# Patient Record
Sex: FEMALE | Race: WHITE | URBAN - METROPOLITAN AREA
[De-identification: names, ages, dates, MRNs, and addresses within clinical notes are randomized per-mention and may not be internally consistent; named-entity substitution may affect disease eponyms.]

---

## 2017-10-23 ENCOUNTER — HISTORICAL (OUTPATIENT)
Dept: ADMINISTRATIVE | Facility: HOSPITAL | Age: 67
End: 2017-10-23

## 2017-10-27 ENCOUNTER — HISTORICAL (OUTPATIENT)
Dept: ADMINISTRATIVE | Facility: HOSPITAL | Age: 67
End: 2017-10-27

## 2017-12-25 ENCOUNTER — HISTORICAL (OUTPATIENT)
Dept: ADMINISTRATIVE | Facility: HOSPITAL | Age: 67
End: 2017-12-25

## 2018-02-22 ENCOUNTER — HISTORICAL (OUTPATIENT)
Dept: ADMINISTRATIVE | Facility: HOSPITAL | Age: 68
End: 2018-02-22

## 2018-04-04 ENCOUNTER — HISTORICAL (OUTPATIENT)
Dept: ADMINISTRATIVE | Facility: HOSPITAL | Age: 68
End: 2018-04-04

## 2018-04-05 LAB — GRAM STN SPEC: NORMAL

## 2018-04-07 LAB — FINAL CULTURE: NORMAL

## 2018-04-24 ENCOUNTER — HISTORICAL (OUTPATIENT)
Dept: ADMINISTRATIVE | Facility: HOSPITAL | Age: 68
End: 2018-04-24

## 2018-04-24 LAB
ABS NEUT (OLG): 4.76 X10(3)/MCL (ref 2.1–9.2)
ALBUMIN SERPL-MCNC: 3.7 GM/DL (ref 3.4–5)
ALBUMIN/GLOB SERPL: 0.9 RATIO (ref 1.1–2)
ALP SERPL-CCNC: 77 UNIT/L (ref 38–126)
ALT SERPL-CCNC: 32 UNIT/L (ref 12–78)
APPEARANCE, UA: ABNORMAL
AST SERPL-CCNC: 21 UNIT/L (ref 15–37)
BACTERIA SPEC CULT: ABNORMAL /HPF
BASOPHILS # BLD AUTO: 0 X10(3)/MCL (ref 0–0.2)
BASOPHILS NFR BLD AUTO: 0 %
BILIRUB SERPL-MCNC: 0.6 MG/DL (ref 0.2–1)
BILIRUB UR QL STRIP: NEGATIVE
BILIRUBIN DIRECT+TOT PNL SERPL-MCNC: 0 MG/DL (ref 0–0.5)
BILIRUBIN DIRECT+TOT PNL SERPL-MCNC: 0.6 MG/DL (ref 0–0.8)
BUN SERPL-MCNC: 26 MG/DL (ref 7–18)
CALCIUM SERPL-MCNC: 9.6 MG/DL (ref 8.5–10.1)
CHLORIDE SERPL-SCNC: 103 MMOL/L (ref 98–107)
CHOLEST SERPL-MCNC: 251 MG/DL (ref 0–200)
CHOLEST/HDLC SERPL: 6.3 {RATIO} (ref 0–4)
CO2 SERPL-SCNC: 31 MMOL/L (ref 21–32)
COLOR UR: YELLOW
CREAT SERPL-MCNC: 0.98 MG/DL (ref 0.55–1.02)
CREAT UR-MCNC: 83 MG/DL
EOSINOPHIL # BLD AUTO: 0.2 X10(3)/MCL (ref 0–0.9)
EOSINOPHIL NFR BLD AUTO: 3 %
ERYTHROCYTE [DISTWIDTH] IN BLOOD BY AUTOMATED COUNT: 13.5 % (ref 11.5–17)
EST. AVERAGE GLUCOSE BLD GHB EST-MCNC: 183 MG/DL
GLOBULIN SER-MCNC: 4 GM/DL (ref 2.4–3.5)
GLUCOSE (UA): NEGATIVE
GLUCOSE SERPL-MCNC: 216 MG/DL (ref 74–106)
HBA1C MFR BLD: 8 % (ref 4.2–6.3)
HCT VFR BLD AUTO: 36.1 % (ref 37–47)
HDLC SERPL-MCNC: 40 MG/DL (ref 35–60)
HGB BLD-MCNC: 11.7 GM/DL (ref 12–16)
HGB UR QL STRIP: NEGATIVE
KETONES UR QL STRIP: NEGATIVE
LDLC SERPL CALC-MCNC: 121 MG/DL (ref 0–129)
LEUKOCYTE ESTERASE UR QL STRIP: NEGATIVE
LYMPHOCYTES # BLD AUTO: 2.2 X10(3)/MCL (ref 0.6–4.6)
LYMPHOCYTES NFR BLD AUTO: 28 %
MCH RBC QN AUTO: 30.2 PG (ref 27–31)
MCHC RBC AUTO-ENTMCNC: 32.4 GM/DL (ref 33–36)
MCV RBC AUTO: 93.3 FL (ref 80–94)
MICROALBUMIN UR-MCNC: 1.1 MG/DL
MICROALBUMIN/CREAT RATIO PNL UR: 13.3 MG/GM CR (ref 0–30)
MONOCYTES # BLD AUTO: 0.6 X10(3)/MCL (ref 0.1–1.3)
MONOCYTES NFR BLD AUTO: 8 %
NEUTROPHILS # BLD AUTO: 4.76 X10(3)/MCL (ref 2.1–9.2)
NEUTROPHILS NFR BLD AUTO: 61 %
NITRITE UR QL STRIP: NEGATIVE
PH UR STRIP: 5 [PH] (ref 5–9)
PLATELET # BLD AUTO: 262 X10(3)/MCL (ref 130–400)
PMV BLD AUTO: 11.3 FL (ref 9.4–12.4)
POTASSIUM SERPL-SCNC: 4 MMOL/L (ref 3.5–5.1)
PROT SERPL-MCNC: 7.7 GM/DL (ref 6.4–8.2)
PROT UR QL STRIP: NEGATIVE
RBC # BLD AUTO: 3.87 X10(6)/MCL (ref 4.2–5.4)
RBC #/AREA URNS HPF: ABNORMAL /[HPF]
SODIUM SERPL-SCNC: 141 MMOL/L (ref 136–145)
SP GR UR STRIP: 1.02 (ref 1–1.03)
SQUAMOUS EPITHELIAL, UA: ABNORMAL
TRIGL SERPL-MCNC: 452 MG/DL (ref 30–150)
TSH SERPL-ACNC: 1.05 MIU/L (ref 0.36–3.74)
UA WBC MAN: ABNORMAL
UROBILINOGEN UR STRIP-ACNC: 0.2
VLDLC SERPL CALC-MCNC: 90 MG/DL
WBC # SPEC AUTO: 7.8 X10(3)/MCL (ref 4.5–11.5)

## 2018-04-26 LAB — FINAL CULTURE: NORMAL

## 2018-09-04 ENCOUNTER — HISTORICAL (OUTPATIENT)
Dept: ADMINISTRATIVE | Facility: HOSPITAL | Age: 68
End: 2018-09-04

## 2018-09-19 ENCOUNTER — HISTORICAL (OUTPATIENT)
Dept: ADMINISTRATIVE | Facility: HOSPITAL | Age: 68
End: 2018-09-19

## 2018-09-19 LAB — POC CREATININE: 0.9 MG/DL (ref 0.6–1.3)

## 2019-04-05 ENCOUNTER — HISTORICAL (OUTPATIENT)
Dept: ADMINISTRATIVE | Facility: HOSPITAL | Age: 69
End: 2019-04-05

## 2019-04-05 LAB
ABS NEUT (OLG): 13.25 X10(3)/MCL (ref 2.1–9.2)
ALBUMIN SERPL-MCNC: 3.2 GM/DL (ref 3.4–5)
ALBUMIN/GLOB SERPL: 0.9 {RATIO}
ALP SERPL-CCNC: 85 UNIT/L (ref 38–126)
ALT SERPL-CCNC: 44 UNIT/L (ref 12–78)
APPEARANCE, UA: CLEAR
AST SERPL-CCNC: 13 UNIT/L (ref 15–37)
BACTERIA SPEC CULT: ABNORMAL /HPF
BASOPHILS # BLD AUTO: 0 X10(3)/MCL (ref 0–0.2)
BASOPHILS NFR BLD AUTO: 0 %
BILIRUB SERPL-MCNC: 0.3 MG/DL (ref 0.2–1)
BILIRUB UR QL STRIP: NEGATIVE
BILIRUBIN DIRECT+TOT PNL SERPL-MCNC: 0.1 MG/DL (ref 0–0.2)
BILIRUBIN DIRECT+TOT PNL SERPL-MCNC: 0.2 MG/DL (ref 0–0.8)
BUN SERPL-MCNC: 48 MG/DL (ref 7–18)
CALCIUM SERPL-MCNC: 9.4 MG/DL (ref 8.5–10.1)
CHLORIDE SERPL-SCNC: 98 MMOL/L (ref 98–107)
CHOLEST SERPL-MCNC: 274 MG/DL (ref 0–200)
CHOLEST/HDLC SERPL: 2.5 {RATIO} (ref 0–4)
CO2 SERPL-SCNC: 30 MMOL/L (ref 21–32)
COLOR UR: YELLOW
CREAT SERPL-MCNC: 1 MG/DL (ref 0.55–1.02)
CREAT UR-MCNC: 60 MG/DL
EOSINOPHIL # BLD AUTO: 0.1 X10(3)/MCL (ref 0–0.9)
EOSINOPHIL NFR BLD AUTO: 0 %
ERYTHROCYTE [DISTWIDTH] IN BLOOD BY AUTOMATED COUNT: 15.9 % (ref 11.5–17)
EST. AVERAGE GLUCOSE BLD GHB EST-MCNC: 237 MG/DL
GLOBULIN SER-MCNC: 3.4 GM/DL (ref 2.4–3.5)
GLUCOSE (UA): ABNORMAL
GLUCOSE SERPL-MCNC: 333 MG/DL (ref 74–106)
HBA1C MFR BLD: 9.9 % (ref 4.2–6.3)
HCT VFR BLD AUTO: 42.7 % (ref 37–47)
HDLC SERPL-MCNC: 111 MG/DL (ref 35–60)
HGB BLD-MCNC: 13 GM/DL (ref 12–16)
HGB UR QL STRIP: NEGATIVE
KETONES UR QL STRIP: NEGATIVE
LDLC SERPL CALC-MCNC: 134 MG/DL (ref 0–129)
LEUKOCYTE ESTERASE UR QL STRIP: NEGATIVE
LYMPHOCYTES # BLD AUTO: 0.8 X10(3)/MCL (ref 0.6–4.6)
LYMPHOCYTES NFR BLD AUTO: 5 %
MCH RBC QN AUTO: 30.5 PG (ref 27–31)
MCHC RBC AUTO-ENTMCNC: 30.4 GM/DL (ref 33–36)
MCV RBC AUTO: 100.2 FL (ref 80–94)
MICROALBUMIN UR-MCNC: 2.7 MG/DL
MICROALBUMIN/CREAT RATIO PNL UR: 45 MG/GM CR (ref 0–30)
MONOCYTES # BLD AUTO: 1 X10(3)/MCL (ref 0.1–1.3)
MONOCYTES NFR BLD AUTO: 7 %
NEUTROPHILS # BLD AUTO: 13.25 X10(3)/MCL (ref 2.1–9.2)
NEUTROPHILS NFR BLD AUTO: 87 %
NITRITE UR QL STRIP: NEGATIVE
PH UR STRIP: 5 [PH] (ref 5–9)
PLATELET # BLD AUTO: 180 X10(3)/MCL (ref 130–400)
PMV BLD AUTO: 11.2 FL (ref 9.4–12.4)
POTASSIUM SERPL-SCNC: 4.1 MMOL/L (ref 3.5–5.1)
PROT SERPL-MCNC: 6.6 GM/DL (ref 6.4–8.2)
PROT UR QL STRIP: NEGATIVE
RBC # BLD AUTO: 4.26 X10(6)/MCL (ref 4.2–5.4)
RBC #/AREA URNS HPF: 5 /HPF (ref 0–2)
SODIUM SERPL-SCNC: 139 MMOL/L (ref 136–145)
SP GR UR STRIP: 1.03 (ref 1–1.03)
SQUAMOUS EPITHELIAL, UA: ABNORMAL
TRIGL SERPL-MCNC: 143 MG/DL (ref 30–150)
TSH SERPL-ACNC: 0.13 MIU/L (ref 0.36–3.74)
UROBILINOGEN UR STRIP-ACNC: 0.2
VLDLC SERPL CALC-MCNC: 29 MG/DL
WBC # SPEC AUTO: 15.3 X10(3)/MCL (ref 4.5–11.5)
WBC #/AREA URNS HPF: ABNORMAL /[HPF]

## 2019-05-01 ENCOUNTER — HISTORICAL (OUTPATIENT)
Dept: ADMINISTRATIVE | Facility: HOSPITAL | Age: 69
End: 2019-05-01

## 2019-05-07 ENCOUNTER — HISTORICAL (OUTPATIENT)
Dept: ADMINISTRATIVE | Facility: HOSPITAL | Age: 69
End: 2019-05-07

## 2019-05-07 LAB
ABS NEUT (OLG): 7.44 X10(3)/MCL (ref 2.1–9.2)
ALBUMIN SERPL-MCNC: 3 GM/DL (ref 3.4–5)
ALBUMIN/GLOB SERPL: 0.9 {RATIO}
ALP SERPL-CCNC: 68 UNIT/L (ref 38–126)
ALT SERPL-CCNC: 62 UNIT/L (ref 12–78)
AST SERPL-CCNC: 15 UNIT/L (ref 15–37)
BASOPHILS # BLD AUTO: 0 X10(3)/MCL (ref 0–0.2)
BASOPHILS NFR BLD AUTO: 0 %
BILIRUB SERPL-MCNC: 0.3 MG/DL (ref 0.2–1)
BILIRUBIN DIRECT+TOT PNL SERPL-MCNC: 0.1 MG/DL (ref 0–0.2)
BILIRUBIN DIRECT+TOT PNL SERPL-MCNC: 0.2 MG/DL (ref 0–0.8)
BUN SERPL-MCNC: 40 MG/DL (ref 7–18)
CALCIUM SERPL-MCNC: 8.4 MG/DL (ref 8.5–10.1)
CHLORIDE SERPL-SCNC: 101 MMOL/L (ref 98–107)
CO2 SERPL-SCNC: 25 MMOL/L (ref 21–32)
CREAT SERPL-MCNC: 1.12 MG/DL (ref 0.55–1.02)
EOSINOPHIL # BLD AUTO: 0.1 X10(3)/MCL (ref 0–0.9)
EOSINOPHIL NFR BLD AUTO: 1 %
ERYTHROCYTE [DISTWIDTH] IN BLOOD BY AUTOMATED COUNT: 15.9 % (ref 11.5–17)
GLOBULIN SER-MCNC: 3.4 GM/DL (ref 2.4–3.5)
GLUCOSE SERPL-MCNC: 352 MG/DL (ref 74–106)
HCT VFR BLD AUTO: 40.5 % (ref 37–47)
HGB BLD-MCNC: 12.7 GM/DL (ref 12–16)
LYMPHOCYTES # BLD AUTO: 2.2 X10(3)/MCL (ref 0.6–4.6)
LYMPHOCYTES NFR BLD AUTO: 20 %
MCH RBC QN AUTO: 30.8 PG (ref 27–31)
MCHC RBC AUTO-ENTMCNC: 31.4 GM/DL (ref 33–36)
MCV RBC AUTO: 98.1 FL (ref 80–94)
MONOCYTES # BLD AUTO: 0.8 X10(3)/MCL (ref 0.1–1.3)
MONOCYTES NFR BLD AUTO: 7 %
NEUTROPHILS # BLD AUTO: 7.44 X10(3)/MCL (ref 2.1–9.2)
NEUTROPHILS NFR BLD AUTO: 69 %
PLATELET # BLD AUTO: 181 X10(3)/MCL (ref 130–400)
PMV BLD AUTO: 11.3 FL (ref 9.4–12.4)
POTASSIUM SERPL-SCNC: 3.9 MMOL/L (ref 3.5–5.1)
PROT SERPL-MCNC: 6.4 GM/DL (ref 6.4–8.2)
RBC # BLD AUTO: 4.13 X10(6)/MCL (ref 4.2–5.4)
SODIUM SERPL-SCNC: 134 MMOL/L (ref 136–145)
T3FREE SERPL-MCNC: 1.8 PG/ML (ref 2.18–3.98)
T4 FREE SERPL-MCNC: 0.73 NG/DL (ref 0.76–1.46)
TSH SERPL-ACNC: 0.47 MIU/L (ref 0.36–3.74)
WBC # SPEC AUTO: 10.8 X10(3)/MCL (ref 4.5–11.5)

## 2019-07-05 ENCOUNTER — HISTORICAL (OUTPATIENT)
Dept: ADMINISTRATIVE | Facility: HOSPITAL | Age: 69
End: 2019-07-05

## 2019-07-05 LAB
EST. AVERAGE GLUCOSE BLD GHB EST-MCNC: 203 MG/DL
HBA1C MFR BLD: 8.7 % (ref 4.2–6.3)

## 2019-08-01 ENCOUNTER — HISTORICAL (OUTPATIENT)
Dept: ADMINISTRATIVE | Facility: HOSPITAL | Age: 69
End: 2019-08-01

## 2019-08-01 LAB
ABS NEUT (OLG): 13.23 X10(3)/MCL (ref 2.1–9.2)
ALBUMIN SERPL-MCNC: 3.5 GM/DL (ref 3.4–5)
ALBUMIN/GLOB SERPL: 1 RATIO (ref 1.1–2)
ALP SERPL-CCNC: 128 UNIT/L (ref 38–126)
ALT SERPL-CCNC: 32 UNIT/L (ref 12–78)
APTT PPP: 24 SECOND(S) (ref 24.2–33.9)
AST SERPL-CCNC: 11 UNIT/L (ref 15–37)
BASOPHILS # BLD AUTO: 0 X10(3)/MCL (ref 0–0.2)
BASOPHILS NFR BLD AUTO: 0 %
BILIRUB SERPL-MCNC: 0.4 MG/DL (ref 0.2–1)
BILIRUBIN DIRECT+TOT PNL SERPL-MCNC: 0.1 MG/DL (ref 0–0.5)
BILIRUBIN DIRECT+TOT PNL SERPL-MCNC: 0.3 MG/DL (ref 0–0.8)
BUN SERPL-MCNC: 33 MG/DL (ref 7–18)
BUN SERPL-MCNC: 35 MG/DL (ref 7–18)
CALCIUM SERPL-MCNC: 9.7 MG/DL (ref 8.5–10.1)
CALCIUM SERPL-MCNC: 9.9 MG/DL (ref 8.5–10.1)
CHLORIDE SERPL-SCNC: 100 MMOL/L (ref 98–107)
CHLORIDE SERPL-SCNC: 103 MMOL/L (ref 98–107)
CO2 SERPL-SCNC: 27 MMOL/L (ref 21–32)
CO2 SERPL-SCNC: 28 MMOL/L (ref 21–32)
CREAT SERPL-MCNC: 1.02 MG/DL (ref 0.55–1.02)
CREAT SERPL-MCNC: 1.02 MG/DL (ref 0.55–1.02)
CREAT/UREA NIT SERPL: 32.4
ERYTHROCYTE [DISTWIDTH] IN BLOOD BY AUTOMATED COUNT: 13.1 % (ref 11.5–17)
GLOBULIN SER-MCNC: 3.6 GM/DL (ref 2.4–3.5)
GLUCOSE SERPL-MCNC: 217 MG/DL (ref 74–106)
GLUCOSE SERPL-MCNC: 235 MG/DL (ref 74–106)
HCT VFR BLD AUTO: 42.8 % (ref 37–47)
HCT VFR BLD AUTO: 44.4 % (ref 37–47)
HGB BLD-MCNC: 14.1 GM/DL (ref 12–16)
HGB BLD-MCNC: 14.4 GM/DL (ref 12–16)
INR PPP: 1.1 (ref 0–1.3)
LYMPHOCYTES # BLD AUTO: 2 X10(3)/MCL (ref 0.6–4.6)
LYMPHOCYTES NFR BLD AUTO: 12 %
MCH RBC QN AUTO: 32.5 PG (ref 27–31)
MCHC RBC AUTO-ENTMCNC: 32.9 GM/DL (ref 33–36)
MCV RBC AUTO: 98.6 FL (ref 80–94)
MONOCYTES # BLD AUTO: 1 X10(3)/MCL (ref 0.1–1.3)
MONOCYTES NFR BLD AUTO: 6 %
NEUTROPHILS # BLD AUTO: 13.23 X10(3)/MCL (ref 2.1–9.2)
NEUTROPHILS NFR BLD AUTO: 81 %
PLATELET # BLD AUTO: 233 X10(3)/MCL (ref 130–400)
PLATELET # BLD AUTO: 245 X10(3)/MCL (ref 130–400)
PMV BLD AUTO: 11 FL (ref 9.4–12.4)
POC CREATININE: 1 MG/DL (ref 0.6–1.3)
POTASSIUM SERPL-SCNC: 3.9 MMOL/L (ref 3.5–5.1)
POTASSIUM SERPL-SCNC: 4 MMOL/L (ref 3.5–5.1)
PROT SERPL-MCNC: 7.1 GM/DL (ref 6.4–8.2)
PROTHROMBIN TIME: 14.1 SECOND(S) (ref 12–14)
RBC # BLD AUTO: 4.34 X10(6)/MCL (ref 4.2–5.4)
SODIUM SERPL-SCNC: 138 MMOL/L (ref 136–145)
SODIUM SERPL-SCNC: 140 MMOL/L (ref 136–145)
TROPONIN I SERPL-MCNC: <0.02 NG/ML (ref 0.02–0.49)
WBC # SPEC AUTO: 16.3 X10(3)/MCL (ref 4.5–11.5)

## 2019-08-02 LAB
ABS NEUT (OLG): 8.18 X10(3)/MCL (ref 2.1–9.2)
APPEARANCE, UA: CLEAR
BACTERIA SPEC CULT: ABNORMAL /HPF
BASOPHILS # BLD AUTO: 0 X10(3)/MCL (ref 0–0.2)
BASOPHILS NFR BLD AUTO: 0 %
BILIRUB UR QL STRIP: NEGATIVE
BUN SERPL-MCNC: 28 MG/DL (ref 7–18)
CALCIUM SERPL-MCNC: 9 MG/DL (ref 8.5–10.1)
CHLORIDE SERPL-SCNC: 106 MMOL/L (ref 98–107)
CO2 SERPL-SCNC: 30 MMOL/L (ref 21–32)
COLOR UR: YELLOW
CREAT SERPL-MCNC: 0.81 MG/DL (ref 0.55–1.02)
CREAT/UREA NIT SERPL: 34.6
EOSINOPHIL # BLD AUTO: 0 X10(3)/MCL (ref 0–0.9)
EOSINOPHIL NFR BLD AUTO: 0 %
ERYTHROCYTE [DISTWIDTH] IN BLOOD BY AUTOMATED COUNT: 13 % (ref 11.5–17)
EST. AVERAGE GLUCOSE BLD GHB EST-MCNC: 183 MG/DL
GLUCOSE (UA): NEGATIVE
GLUCOSE SERPL-MCNC: 129 MG/DL (ref 74–106)
HBA1C MFR BLD: 8 % (ref 4.2–6.3)
HCT VFR BLD AUTO: 40 % (ref 37–47)
HGB BLD-MCNC: 12.9 GM/DL (ref 12–16)
HGB UR QL STRIP: NEGATIVE
KETONES UR QL STRIP: NEGATIVE
LEUKOCYTE ESTERASE UR QL STRIP: ABNORMAL
LYMPHOCYTES # BLD AUTO: 1.8 X10(3)/MCL (ref 0.6–4.6)
LYMPHOCYTES NFR BLD AUTO: 16 %
MCH RBC QN AUTO: 32.1 PG (ref 27–31)
MCHC RBC AUTO-ENTMCNC: 32.3 GM/DL (ref 33–36)
MCV RBC AUTO: 99.5 FL (ref 80–94)
MONOCYTES # BLD AUTO: 1 X10(3)/MCL (ref 0.1–1.3)
MONOCYTES NFR BLD AUTO: 9 %
NEUTROPHILS # BLD AUTO: 8.18 X10(3)/MCL (ref 2.1–9.2)
NEUTROPHILS NFR BLD AUTO: 74 %
NITRITE UR QL STRIP: NEGATIVE
PH UR STRIP: 5.5 [PH] (ref 5–9)
PLATELET # BLD AUTO: 196 X10(3)/MCL (ref 130–400)
PMV BLD AUTO: 10.9 FL (ref 9.4–12.4)
POTASSIUM SERPL-SCNC: 3.5 MMOL/L (ref 3.5–5.1)
PROT UR QL STRIP: NEGATIVE
RBC # BLD AUTO: 4.02 X10(6)/MCL (ref 4.2–5.4)
RBC #/AREA URNS HPF: ABNORMAL /[HPF]
SODIUM SERPL-SCNC: 143 MMOL/L (ref 136–145)
SP GR UR STRIP: 1.02 (ref 1–1.03)
SQUAMOUS EPITHELIAL, UA: ABNORMAL
UROBILINOGEN UR STRIP-ACNC: 0.2
WBC # SPEC AUTO: 11.1 X10(3)/MCL (ref 4.5–11.5)
WBC #/AREA URNS HPF: 58 /HPF (ref 0–3)

## 2019-08-05 LAB
ABS NEUT (OLG): 20.47 X10(3)/MCL (ref 2.1–9.2)
ALBUMIN SERPL-MCNC: 3 GM/DL (ref 3.4–5)
ALBUMIN/GLOB SERPL: 1 RATIO (ref 1.1–2)
ALP SERPL-CCNC: 102 UNIT/L (ref 38–126)
ALT SERPL-CCNC: 35 UNIT/L (ref 12–78)
AST SERPL-CCNC: 19 UNIT/L (ref 15–37)
BILIRUB SERPL-MCNC: 0.4 MG/DL (ref 0.2–1)
BILIRUBIN DIRECT+TOT PNL SERPL-MCNC: 0.1 MG/DL (ref 0–0.5)
BILIRUBIN DIRECT+TOT PNL SERPL-MCNC: 0.3 MG/DL (ref 0–0.8)
BUN SERPL-MCNC: 28 MG/DL (ref 7–18)
CALCIUM SERPL-MCNC: 8.2 MG/DL (ref 8.5–10.1)
CHLORIDE SERPL-SCNC: 107 MMOL/L (ref 98–107)
CO2 SERPL-SCNC: 23 MMOL/L (ref 21–32)
CREAT SERPL-MCNC: 0.87 MG/DL (ref 0.55–1.02)
ERYTHROCYTE [DISTWIDTH] IN BLOOD BY AUTOMATED COUNT: 13.2 % (ref 11.5–17)
GLOBULIN SER-MCNC: 3.1 GM/DL (ref 2.4–3.5)
GLUCOSE SERPL-MCNC: 366 MG/DL (ref 74–106)
GROUP & RH: NORMAL
HCT VFR BLD AUTO: 40.8 % (ref 37–47)
HGB BLD-MCNC: 13.2 GM/DL (ref 12–16)
LYMPHOCYTES NFR BLD MANUAL: 0 % (ref 13–40)
MACROCYTES BLD QL SMEAR: ABNORMAL
MCH RBC QN AUTO: 32.5 PG (ref 27–31)
MCHC RBC AUTO-ENTMCNC: 32.4 GM/DL (ref 33–36)
MCV RBC AUTO: 100.5 FL (ref 80–94)
MONOCYTES NFR BLD MANUAL: 4 % (ref 2–11)
NEUTROPHILS NFR BLD MANUAL: 96 % (ref 47–80)
PLATELET # BLD AUTO: 166 X10(3)/MCL (ref 130–400)
PLATELET # BLD EST: NORMAL 10*3/UL
PMV BLD AUTO: 10.8 FL (ref 7.4–10.4)
POTASSIUM SERPL-SCNC: 4.1 MMOL/L (ref 3.5–5.1)
PROT SERPL-MCNC: 6.1 GM/DL (ref 6.4–8.2)
RBC # BLD AUTO: 4.06 X10(6)/MCL (ref 4.2–5.4)
RBC MORPH BLD: ABNORMAL
SODIUM SERPL-SCNC: 142 MMOL/L (ref 136–145)
WBC # SPEC AUTO: 21.8 X10(3)/MCL (ref 4.5–11.5)

## 2019-08-06 LAB
ABS NEUT (OLG): 16.91 X10(3)/MCL (ref 2.1–9.2)
ALBUMIN SERPL-MCNC: 2.8 GM/DL (ref 3.4–5)
ALBUMIN/GLOB SERPL: 0.9 RATIO (ref 1.1–2)
ALP SERPL-CCNC: 97 UNIT/L (ref 38–126)
ALT SERPL-CCNC: 31 UNIT/L (ref 12–78)
AST SERPL-CCNC: 20 UNIT/L (ref 15–37)
BASOPHILS # BLD AUTO: 0 X10(3)/MCL (ref 0–0.2)
BASOPHILS NFR BLD AUTO: 0 %
BILIRUB SERPL-MCNC: 0.3 MG/DL (ref 0.2–1)
BILIRUBIN DIRECT+TOT PNL SERPL-MCNC: 0.1 MG/DL (ref 0–0.5)
BILIRUBIN DIRECT+TOT PNL SERPL-MCNC: 0.2 MG/DL (ref 0–0.8)
BUN SERPL-MCNC: 33 MG/DL (ref 7–18)
CALCIUM SERPL-MCNC: 8.3 MG/DL (ref 8.5–10.1)
CHLORIDE SERPL-SCNC: 107 MMOL/L (ref 98–107)
CO2 SERPL-SCNC: 24 MMOL/L (ref 21–32)
CREAT SERPL-MCNC: 0.94 MG/DL (ref 0.55–1.02)
ERYTHROCYTE [DISTWIDTH] IN BLOOD BY AUTOMATED COUNT: 13.2 % (ref 11.5–17)
GLOBULIN SER-MCNC: 3.2 GM/DL (ref 2.4–3.5)
GLUCOSE SERPL-MCNC: 357 MG/DL (ref 74–106)
HCT VFR BLD AUTO: 37.9 % (ref 37–47)
HGB BLD-MCNC: 12.2 GM/DL (ref 12–16)
IMM GRANULOCYTES # BLD AUTO: 0 10*3/UL
IMM GRANULOCYTES NFR BLD AUTO: 1 %
LYMPHOCYTES # BLD AUTO: 1.2 X10(3)/MCL (ref 0.6–4.6)
LYMPHOCYTES NFR BLD AUTO: 6 %
MCH RBC QN AUTO: 32.6 PG (ref 27–31)
MCHC RBC AUTO-ENTMCNC: 32.2 GM/DL (ref 33–36)
MCV RBC AUTO: 101.3 FL (ref 80–94)
MONOCYTES # BLD AUTO: 1.1 X10(3)/MCL (ref 0.1–1.3)
MONOCYTES NFR BLD AUTO: 6 %
NEUTROPHILS # BLD AUTO: 16.91 X10(3)/MCL (ref 2.1–9.2)
NEUTROPHILS NFR BLD AUTO: 87 %
NRBC BLD AUTO-RTO: 0 % (ref 0–0.2)
PLATELET # BLD AUTO: 141 X10(3)/MCL (ref 130–400)
PMV BLD AUTO: 11.3 FL (ref 9.4–12.4)
POTASSIUM SERPL-SCNC: 3.8 MMOL/L (ref 3.5–5.1)
PROT SERPL-MCNC: 6 GM/DL (ref 6.4–8.2)
RBC # BLD AUTO: 3.74 X10(6)/MCL (ref 4.2–5.4)
SODIUM SERPL-SCNC: 141 MMOL/L (ref 136–145)
WBC # SPEC AUTO: 19.4 X10(3)/MCL (ref 4.5–11.5)

## 2019-08-07 LAB
ABS NEUT (OLG): 13.62 X10(3)/MCL (ref 2.1–9.2)
ALBUMIN SERPL-MCNC: 2.8 GM/DL (ref 3.4–5)
ALBUMIN/GLOB SERPL: 0.9 {RATIO}
ALP SERPL-CCNC: 91 UNIT/L (ref 38–126)
ALT SERPL-CCNC: 27 UNIT/L (ref 12–78)
AST SERPL-CCNC: 15 UNIT/L (ref 15–37)
BASOPHILS # BLD AUTO: 0 X10(3)/MCL (ref 0–0.2)
BASOPHILS NFR BLD AUTO: 0 %
BILIRUB SERPL-MCNC: 0.5 MG/DL (ref 0.2–1)
BILIRUBIN DIRECT+TOT PNL SERPL-MCNC: 0.1 MG/DL (ref 0–0.2)
BILIRUBIN DIRECT+TOT PNL SERPL-MCNC: 0.4 MG/DL (ref 0–0.8)
BUN SERPL-MCNC: 18 MG/DL (ref 7–18)
CALCIUM SERPL-MCNC: 8.3 MG/DL (ref 8.5–10.1)
CHLORIDE SERPL-SCNC: 108 MMOL/L (ref 98–107)
CO2 SERPL-SCNC: 24 MMOL/L (ref 21–32)
CREAT SERPL-MCNC: 0.7 MG/DL (ref 0.55–1.02)
ERYTHROCYTE [DISTWIDTH] IN BLOOD BY AUTOMATED COUNT: 13.3 % (ref 11.5–17)
GLOBULIN SER-MCNC: 3.1 GM/DL (ref 2.4–3.5)
GLUCOSE SERPL-MCNC: 252 MG/DL (ref 74–106)
HCT VFR BLD AUTO: 38.5 % (ref 37–47)
HGB BLD-MCNC: 12.6 GM/DL (ref 12–16)
LYMPHOCYTES # BLD AUTO: 1.2 X10(3)/MCL (ref 0.6–4.6)
LYMPHOCYTES NFR BLD AUTO: 7 %
MCH RBC QN AUTO: 32.6 PG (ref 27–31)
MCHC RBC AUTO-ENTMCNC: 32.7 GM/DL (ref 33–36)
MCV RBC AUTO: 99.5 FL (ref 80–94)
MONOCYTES # BLD AUTO: 0.8 X10(3)/MCL (ref 0.1–1.3)
MONOCYTES NFR BLD AUTO: 5 %
NEUTROPHILS # BLD AUTO: 13.62 X10(3)/MCL (ref 2.1–9.2)
NEUTROPHILS NFR BLD AUTO: 86 %
PLATELET # BLD AUTO: 154 X10(3)/MCL (ref 130–400)
PMV BLD AUTO: 11.4 FL (ref 9.4–12.4)
POTASSIUM SERPL-SCNC: 4.3 MMOL/L (ref 3.5–5.1)
PROT SERPL-MCNC: 5.9 GM/DL (ref 6.4–8.2)
RBC # BLD AUTO: 3.87 X10(6)/MCL (ref 4.2–5.4)
SODIUM SERPL-SCNC: 139 MMOL/L (ref 136–145)
WBC # SPEC AUTO: 15.8 X10(3)/MCL (ref 4.5–11.5)

## 2019-08-08 LAB
ABS NEUT (OLG): 9.51 X10(3)/MCL (ref 2.1–9.2)
BASOPHILS # BLD AUTO: 0 X10(3)/MCL (ref 0–0.2)
BASOPHILS NFR BLD AUTO: 0 %
BUN SERPL-MCNC: 17 MG/DL (ref 7–18)
CALCIUM SERPL-MCNC: 8.9 MG/DL (ref 8.5–10.1)
CHLORIDE SERPL-SCNC: 109 MMOL/L (ref 98–107)
CO2 SERPL-SCNC: 27 MMOL/L (ref 21–32)
CREAT SERPL-MCNC: 0.62 MG/DL (ref 0.55–1.02)
CREAT/UREA NIT SERPL: 27.4
ERYTHROCYTE [DISTWIDTH] IN BLOOD BY AUTOMATED COUNT: 13.2 % (ref 11.5–17)
GLUCOSE SERPL-MCNC: 190 MG/DL (ref 74–106)
HCT VFR BLD AUTO: 39.6 % (ref 37–47)
HGB BLD-MCNC: 12.5 GM/DL (ref 12–16)
IMM GRANULOCYTES # BLD AUTO: 0 10*3/UL
IMM GRANULOCYTES NFR BLD AUTO: 1 %
LYMPHOCYTES # BLD AUTO: 0.9 X10(3)/MCL (ref 0.6–4.6)
LYMPHOCYTES NFR BLD AUTO: 8 %
MCH RBC QN AUTO: 32.4 PG (ref 27–31)
MCHC RBC AUTO-ENTMCNC: 31.6 GM/DL (ref 33–36)
MCV RBC AUTO: 102.6 FL (ref 80–94)
MONOCYTES # BLD AUTO: 0.7 X10(3)/MCL (ref 0.1–1.3)
MONOCYTES NFR BLD AUTO: 6 %
NEUTROPHILS # BLD AUTO: 9.51 X10(3)/MCL (ref 2.1–9.2)
NEUTROPHILS NFR BLD AUTO: 84 %
PLATELET # BLD AUTO: 95 X10(3)/MCL (ref 130–400)
PMV BLD AUTO: 12.2 FL (ref 9.4–12.4)
POTASSIUM SERPL-SCNC: 4.8 MMOL/L (ref 3.5–5.1)
RBC # BLD AUTO: 3.86 X10(6)/MCL (ref 4.2–5.4)
SODIUM SERPL-SCNC: 142 MMOL/L (ref 136–145)
WBC # SPEC AUTO: 11.3 X10(3)/MCL (ref 4.5–11.5)

## 2019-08-09 LAB
ABS NEUT (OLG): 8.04 X10(3)/MCL (ref 2.1–9.2)
ALBUMIN SERPL-MCNC: 2.8 GM/DL (ref 3.4–5)
ALBUMIN/GLOB SERPL: 0.9 RATIO (ref 1.1–2)
ALP SERPL-CCNC: 81 UNIT/L (ref 38–126)
ALT SERPL-CCNC: 34 UNIT/L (ref 12–78)
AST SERPL-CCNC: 23 UNIT/L (ref 15–37)
BASOPHILS # BLD AUTO: 0 X10(3)/MCL (ref 0–0.2)
BASOPHILS NFR BLD AUTO: 0 %
BILIRUB SERPL-MCNC: 0.4 MG/DL (ref 0.2–1)
BILIRUBIN DIRECT+TOT PNL SERPL-MCNC: 0.1 MG/DL (ref 0–0.5)
BILIRUBIN DIRECT+TOT PNL SERPL-MCNC: 0.3 MG/DL (ref 0–0.8)
BUN SERPL-MCNC: 20 MG/DL (ref 7–18)
CALCIUM SERPL-MCNC: 8.9 MG/DL (ref 8.5–10.1)
CHLORIDE SERPL-SCNC: 106 MMOL/L (ref 98–107)
CO2 SERPL-SCNC: 26 MMOL/L (ref 21–32)
CREAT SERPL-MCNC: 0.62 MG/DL (ref 0.55–1.02)
ERYTHROCYTE [DISTWIDTH] IN BLOOD BY AUTOMATED COUNT: 13.2 % (ref 11.5–17)
GLOBULIN SER-MCNC: 3.2 GM/DL (ref 2.4–3.5)
GLUCOSE SERPL-MCNC: 226 MG/DL (ref 74–106)
HCT VFR BLD AUTO: 40.4 % (ref 37–47)
HGB BLD-MCNC: 13.4 GM/DL (ref 12–16)
LYMPHOCYTES # BLD AUTO: 1.1 X10(3)/MCL (ref 0.6–4.6)
LYMPHOCYTES NFR BLD AUTO: 10 %
MCH RBC QN AUTO: 32.8 PG (ref 27–31)
MCHC RBC AUTO-ENTMCNC: 33.2 GM/DL (ref 33–36)
MCV RBC AUTO: 99 FL (ref 80–94)
MONOCYTES # BLD AUTO: 0.8 X10(3)/MCL (ref 0.1–1.3)
MONOCYTES NFR BLD AUTO: 8 %
NEUTROPHILS # BLD AUTO: 8.04 X10(3)/MCL (ref 2.1–9.2)
NEUTROPHILS NFR BLD AUTO: 80 %
PLATELET # BLD AUTO: 143 X10(3)/MCL (ref 130–400)
PMV BLD AUTO: 10.7 FL (ref 9.4–12.4)
POTASSIUM SERPL-SCNC: 3.9 MMOL/L (ref 3.5–5.1)
PROT SERPL-MCNC: 6 GM/DL (ref 6.4–8.2)
RBC # BLD AUTO: 4.08 X10(6)/MCL (ref 4.2–5.4)
SODIUM SERPL-SCNC: 142 MMOL/L (ref 136–145)
WBC # SPEC AUTO: 10.1 X10(3)/MCL (ref 4.5–11.5)

## 2019-08-10 LAB
ABS NEUT (OLG): 8.94 X10(3)/MCL (ref 2.1–9.2)
APPEARANCE, UA: ABNORMAL
BACTERIA SPEC CULT: ABNORMAL /HPF
BASOPHILS # BLD AUTO: 0 X10(3)/MCL (ref 0–0.2)
BASOPHILS NFR BLD AUTO: 0 %
BILIRUB UR QL STRIP: NEGATIVE
BUN SERPL-MCNC: 31 MG/DL (ref 7–18)
CALCIUM SERPL-MCNC: 8.9 MG/DL (ref 8.5–10.1)
CHLORIDE SERPL-SCNC: 107 MMOL/L (ref 98–107)
CO2 SERPL-SCNC: 27 MMOL/L (ref 21–32)
COLOR UR: YELLOW
CREAT SERPL-MCNC: 0.89 MG/DL (ref 0.55–1.02)
CREAT/UREA NIT SERPL: 34.8
ERYTHROCYTE [DISTWIDTH] IN BLOOD BY AUTOMATED COUNT: 13.3 % (ref 11.5–17)
GLUCOSE (UA): ABNORMAL
GLUCOSE SERPL-MCNC: 286 MG/DL (ref 74–106)
HCT VFR BLD AUTO: 38.3 % (ref 37–47)
HGB BLD-MCNC: 12.4 GM/DL (ref 12–16)
HGB UR QL STRIP: NEGATIVE
KETONES UR QL STRIP: NEGATIVE
LEUKOCYTE ESTERASE UR QL STRIP: ABNORMAL
LYMPHOCYTES # BLD AUTO: 1.5 X10(3)/MCL (ref 0.6–4.6)
LYMPHOCYTES NFR BLD AUTO: 13 %
MCH RBC QN AUTO: 32.4 PG (ref 27–31)
MCHC RBC AUTO-ENTMCNC: 32.4 GM/DL (ref 33–36)
MCV RBC AUTO: 100 FL (ref 80–94)
MONOCYTES # BLD AUTO: 0.9 X10(3)/MCL (ref 0.1–1.3)
MONOCYTES NFR BLD AUTO: 8 %
NEUTROPHILS # BLD AUTO: 8.94 X10(3)/MCL (ref 2.1–9.2)
NEUTROPHILS NFR BLD AUTO: 77 %
NITRITE UR QL STRIP: NEGATIVE
PH UR STRIP: 5 [PH] (ref 5–9)
PLATELET # BLD AUTO: 149 X10(3)/MCL (ref 130–400)
PMV BLD AUTO: 11 FL (ref 9.4–12.4)
POTASSIUM SERPL-SCNC: 4.2 MMOL/L (ref 3.5–5.1)
PROT UR QL STRIP: NEGATIVE
RBC # BLD AUTO: 3.83 X10(6)/MCL (ref 4.2–5.4)
RBC #/AREA URNS HPF: ABNORMAL /[HPF]
SODIUM SERPL-SCNC: 142 MMOL/L (ref 136–145)
SP GR UR STRIP: 1.02 (ref 1–1.03)
SQUAMOUS EPITHELIAL, UA: ABNORMAL
UA WBC MAN: ABNORMAL /HPF
UROBILINOGEN UR STRIP-ACNC: 0.2
WBC # SPEC AUTO: 11.6 X10(3)/MCL (ref 4.5–11.5)

## 2019-08-11 ENCOUNTER — HISTORICAL (OUTPATIENT)
Dept: ADMINISTRATIVE | Facility: HOSPITAL | Age: 69
End: 2019-08-11

## 2019-08-12 LAB
ABS NEUT (OLG): 8.28 X10(3)/MCL (ref 2.1–9.2)
BASOPHILS # BLD AUTO: 0 X10(3)/MCL (ref 0–0.2)
BASOPHILS NFR BLD AUTO: 0 %
BUN SERPL-MCNC: 29 MG/DL (ref 7–18)
CALCIUM SERPL-MCNC: 9 MG/DL (ref 8.5–10.1)
CHLORIDE SERPL-SCNC: 104 MMOL/L (ref 98–107)
CO2 SERPL-SCNC: 26 MMOL/L (ref 21–32)
CREAT SERPL-MCNC: 0.69 MG/DL (ref 0.55–1.02)
CREAT/UREA NIT SERPL: 42
EOSINOPHIL # BLD AUTO: 0 X10(3)/MCL (ref 0–0.9)
EOSINOPHIL NFR BLD AUTO: 0 %
ERYTHROCYTE [DISTWIDTH] IN BLOOD BY AUTOMATED COUNT: 13.3 % (ref 11.5–17)
GLUCOSE SERPL-MCNC: 211 MG/DL (ref 74–106)
HCT VFR BLD AUTO: 37.8 % (ref 37–47)
HGB BLD-MCNC: 12.2 GM/DL (ref 12–16)
LYMPHOCYTES # BLD AUTO: 1.2 X10(3)/MCL (ref 0.6–4.6)
LYMPHOCYTES NFR BLD AUTO: 11 %
MCH RBC QN AUTO: 32.7 PG (ref 27–31)
MCHC RBC AUTO-ENTMCNC: 32.3 GM/DL (ref 33–36)
MCV RBC AUTO: 101.3 FL (ref 80–94)
MONOCYTES # BLD AUTO: 0.6 X10(3)/MCL (ref 0.1–1.3)
MONOCYTES NFR BLD AUTO: 6 %
NEUTROPHILS # BLD AUTO: 8.28 X10(3)/MCL (ref 2.1–9.2)
NEUTROPHILS NFR BLD AUTO: 80 %
PLATELET # BLD AUTO: 158 X10(3)/MCL (ref 130–400)
PMV BLD AUTO: 11.8 FL (ref 9.4–12.4)
POTASSIUM SERPL-SCNC: 4.4 MMOL/L (ref 3.5–5.1)
RBC # BLD AUTO: 3.73 X10(6)/MCL (ref 4.2–5.4)
SODIUM SERPL-SCNC: 139 MMOL/L (ref 136–145)
WBC # SPEC AUTO: 10.3 X10(3)/MCL (ref 4.5–11.5)

## 2019-08-16 LAB
ABS NEUT (OLG): 6.51 X10(3)/MCL (ref 2.1–9.2)
ALBUMIN SERPL-MCNC: 3.1 GM/DL (ref 3.4–5)
ALBUMIN/GLOB SERPL: 0.8 {RATIO}
ALP SERPL-CCNC: 70 UNIT/L (ref 45–117)
ALT SERPL-CCNC: 49 UNIT/L (ref 13–56)
AST SERPL-CCNC: 27 UNIT/L (ref 15–37)
BASOPHILS # BLD AUTO: 0.02 X10(3)/MCL (ref 0–0.2)
BASOPHILS NFR BLD AUTO: 0.2 % (ref 0–1)
BILIRUB SERPL-MCNC: 0.4 MG/DL (ref 0.2–1)
BILIRUBIN DIRECT+TOT PNL SERPL-MCNC: 0.12 MG/DL (ref 0–0.2)
BILIRUBIN DIRECT+TOT PNL SERPL-MCNC: 0.28 MG/DL (ref 0–1)
BUN SERPL-MCNC: 16 MG/DL (ref 7–18)
CALCIUM SERPL-MCNC: 9.3 MG/DL (ref 8.5–10.1)
CHLORIDE SERPL-SCNC: 107 MMOL/L (ref 98–107)
CO2 SERPL-SCNC: 30 MMOL/L (ref 21–32)
CREAT SERPL-MCNC: 0.59 MG/DL (ref 0.55–1.02)
EOSINOPHIL # BLD AUTO: 0.2 X10(3)/MCL (ref 0–0.9)
EOSINOPHIL NFR BLD AUTO: 2.2 % (ref 0–6.4)
ERYTHROCYTE [DISTWIDTH] IN BLOOD BY AUTOMATED COUNT: 13.4 % (ref 11.5–17)
GLOBULIN SER-MCNC: 4 GM/DL (ref 2–4)
GLUCOSE SERPL-MCNC: 112 MG/DL (ref 74–106)
HCT VFR BLD AUTO: 41.5 % (ref 37–47)
HGB BLD-MCNC: 14 GM/DL (ref 12–16)
IMM GRANULOCYTES # BLD AUTO: 0.21 10*3/UL (ref 0–0.02)
IMM GRANULOCYTES NFR BLD AUTO: 2.3 % (ref 0–0.43)
LYMPHOCYTES # BLD AUTO: 1.37 X10(3)/MCL (ref 0.6–4.6)
LYMPHOCYTES NFR BLD AUTO: 15.3 % (ref 16–44)
MCH RBC QN AUTO: 33.3 PG (ref 27–31)
MCHC RBC AUTO-ENTMCNC: 33.7 GM/DL (ref 33–36)
MCV RBC AUTO: 98.6 FL (ref 80–94)
MONOCYTES # BLD AUTO: 0.63 X10(3)/MCL (ref 0.1–1.3)
MONOCYTES NFR BLD AUTO: 7 % (ref 4–12.1)
NEUTROPHILS # BLD AUTO: 6.51 X10(3)/MCL (ref 2.1–9.2)
NEUTROPHILS NFR BLD AUTO: 73 % (ref 43–73)
NRBC BLD AUTO-RTO: 0 % (ref 0–0.2)
PLATELET # BLD AUTO: 149 X10(3)/MCL (ref 130–400)
PMV BLD AUTO: 11 FL (ref 7.4–10.4)
POTASSIUM SERPL-SCNC: 3.8 MMOL/L (ref 3.5–5.1)
PREALB SERPL-MCNC: 34.4 MG/DL (ref 20–40)
PROT SERPL-MCNC: 6.6 GM/DL (ref 6.4–8.2)
RBC # BLD AUTO: 4.21 X10(6)/MCL (ref 4.2–5.4)
SODIUM SERPL-SCNC: 143 MMOL/L (ref 136–145)
WBC # SPEC AUTO: 8.9 X10(3)/MCL (ref 4.5–11.5)

## 2019-08-19 LAB
ABS NEUT (OLG): 6.59 X10(3)/MCL (ref 2.1–9.2)
ALBUMIN SERPL-MCNC: 2.9 GM/DL (ref 3.4–5)
ALBUMIN/GLOB SERPL: 0.7 {RATIO}
ALP SERPL-CCNC: 68 UNIT/L (ref 45–117)
ALT SERPL-CCNC: 64 UNIT/L (ref 13–56)
AST SERPL-CCNC: 29 UNIT/L (ref 15–37)
BASOPHILS # BLD AUTO: 0.02 X10(3)/MCL (ref 0–0.2)
BASOPHILS NFR BLD AUTO: 0.2 % (ref 0–1)
BILIRUB SERPL-MCNC: 0.4 MG/DL (ref 0.2–1)
BILIRUBIN DIRECT+TOT PNL SERPL-MCNC: <0.1 MG/DL (ref 0–0.2)
BILIRUBIN DIRECT+TOT PNL SERPL-MCNC: >0.3 MG/DL (ref 0–1)
BUN SERPL-MCNC: 30 MG/DL (ref 7–18)
CALCIUM SERPL-MCNC: 9.2 MG/DL (ref 8.5–10.1)
CHLORIDE SERPL-SCNC: 104 MMOL/L (ref 98–107)
CO2 SERPL-SCNC: 25 MMOL/L (ref 21–32)
CREAT SERPL-MCNC: 1.24 MG/DL (ref 0.55–1.02)
EOSINOPHIL # BLD AUTO: 0.14 X10(3)/MCL (ref 0–0.9)
EOSINOPHIL NFR BLD AUTO: 1.5 % (ref 0–6.4)
ERYTHROCYTE [DISTWIDTH] IN BLOOD BY AUTOMATED COUNT: 13.6 % (ref 11.5–17)
GLOBULIN SER-MCNC: 4 GM/DL (ref 2–4)
GLUCOSE SERPL-MCNC: 203 MG/DL (ref 74–106)
HCT VFR BLD AUTO: 39.3 % (ref 37–47)
HGB BLD-MCNC: 13.1 GM/DL (ref 12–16)
IMM GRANULOCYTES # BLD AUTO: 0.13 10*3/UL (ref 0–0.02)
IMM GRANULOCYTES NFR BLD AUTO: 1.4 % (ref 0–0.43)
LYMPHOCYTES # BLD AUTO: 1.77 X10(3)/MCL (ref 0.6–4.6)
LYMPHOCYTES NFR BLD AUTO: 19 % (ref 16–44)
MCH RBC QN AUTO: 33.2 PG (ref 27–31)
MCHC RBC AUTO-ENTMCNC: 33.3 GM/DL (ref 33–36)
MCV RBC AUTO: 99.5 FL (ref 80–94)
MONOCYTES # BLD AUTO: 0.65 X10(3)/MCL (ref 0.1–1.3)
MONOCYTES NFR BLD AUTO: 7 % (ref 4–12.1)
NEUTROPHILS # BLD AUTO: 6.59 X10(3)/MCL (ref 2.1–9.2)
NEUTROPHILS NFR BLD AUTO: 70.9 % (ref 43–73)
NRBC BLD AUTO-RTO: 0 % (ref 0–0.2)
PLATELET # BLD AUTO: 124 X10(3)/MCL (ref 130–400)
PMV BLD AUTO: 12.1 FL (ref 7.4–10.4)
POTASSIUM SERPL-SCNC: 4.5 MMOL/L (ref 3.5–5.1)
PREALB SERPL-MCNC: 22.7 MG/DL (ref 20–40)
PROT SERPL-MCNC: 6.4 GM/DL (ref 6.4–8.2)
RBC # BLD AUTO: 3.95 X10(6)/MCL (ref 4.2–5.4)
SODIUM SERPL-SCNC: 136 MMOL/L (ref 136–145)
TSH SERPL-ACNC: 0.6 MIU/ML (ref 0.36–3.74)
WBC # SPEC AUTO: 9.3 X10(3)/MCL (ref 4.5–11.5)

## 2019-08-20 LAB
BUN SERPL-MCNC: 34 MG/DL (ref 7–18)
CALCIUM SERPL-MCNC: 9.6 MG/DL (ref 8.5–10.1)
CHLORIDE SERPL-SCNC: 106 MMOL/L (ref 98–107)
CO2 SERPL-SCNC: 28 MMOL/L (ref 21–32)
CREAT SERPL-MCNC: 1.14 MG/DL (ref 0.55–1.02)
CREAT/UREA NIT SERPL: 30
GLUCOSE SERPL-MCNC: 143 MG/DL (ref 74–106)
POTASSIUM SERPL-SCNC: 4.5 MMOL/L (ref 3.5–5.1)
SODIUM SERPL-SCNC: 139 MMOL/L (ref 136–145)

## 2019-08-26 LAB
ABS NEUT (OLG): 4.77 X10(3)/MCL (ref 2.1–9.2)
ALBUMIN SERPL-MCNC: 3.1 GM/DL (ref 3.4–5)
ALBUMIN/GLOB SERPL: 1 RATIO (ref 1–2)
ALP SERPL-CCNC: 62 UNIT/L (ref 45–117)
ALT SERPL-CCNC: 93 UNIT/L (ref 13–56)
AST SERPL-CCNC: 44 UNIT/L (ref 15–37)
BASOPHILS # BLD AUTO: 0.02 X10(3)/MCL (ref 0–0.2)
BASOPHILS NFR BLD AUTO: 0.3 % (ref 0–1)
BILIRUB SERPL-MCNC: 0.3 MG/DL (ref 0.2–1)
BILIRUBIN DIRECT+TOT PNL SERPL-MCNC: <0.1 MG/DL (ref 0–0.2)
BILIRUBIN DIRECT+TOT PNL SERPL-MCNC: >0.2 MG/DL (ref 0–1)
BUN SERPL-MCNC: 25 MG/DL (ref 7–18)
CALCIUM SERPL-MCNC: 9.7 MG/DL (ref 8.5–10.1)
CHLORIDE SERPL-SCNC: 107 MMOL/L (ref 98–107)
CO2 SERPL-SCNC: 28 MMOL/L (ref 21–32)
CREAT SERPL-MCNC: 0.87 MG/DL (ref 0.55–1.02)
EOSINOPHIL # BLD AUTO: 0.13 X10(3)/MCL (ref 0–0.9)
EOSINOPHIL NFR BLD AUTO: 1.9 % (ref 0–6.4)
ERYTHROCYTE [DISTWIDTH] IN BLOOD BY AUTOMATED COUNT: 13.7 % (ref 11.5–17)
GLOBULIN SER-MCNC: 3 GM/DL (ref 2–4)
GLUCOSE SERPL-MCNC: 244 MG/DL (ref 74–106)
HCT VFR BLD AUTO: 40 % (ref 37–47)
HGB BLD-MCNC: 13.2 GM/DL (ref 12–16)
IMM GRANULOCYTES # BLD AUTO: 0.09 10*3/UL (ref 0–0.02)
IMM GRANULOCYTES NFR BLD AUTO: 1.3 % (ref 0–0.43)
LYMPHOCYTES # BLD AUTO: 1.37 X10(3)/MCL (ref 0.6–4.6)
LYMPHOCYTES NFR BLD AUTO: 19.7 % (ref 16–44)
MCH RBC QN AUTO: 33 PG (ref 27–31)
MCHC RBC AUTO-ENTMCNC: 33 GM/DL (ref 33–36)
MCV RBC AUTO: 100 FL (ref 80–94)
MONOCYTES # BLD AUTO: 0.56 X10(3)/MCL (ref 0.1–1.3)
MONOCYTES NFR BLD AUTO: 8.1 % (ref 4–12.1)
NEUTROPHILS # BLD AUTO: 4.77 X10(3)/MCL (ref 2.1–9.2)
NEUTROPHILS NFR BLD AUTO: 68.7 % (ref 43–73)
NRBC BLD AUTO-RTO: 0 % (ref 0–0.2)
PLATELET # BLD AUTO: 156 X10(3)/MCL (ref 130–400)
PMV BLD AUTO: 10.9 FL (ref 7.4–10.4)
POTASSIUM SERPL-SCNC: 4.3 MMOL/L (ref 3.5–5.1)
PREALB SERPL-MCNC: 24.5 MG/DL (ref 20–40)
PROT SERPL-MCNC: 6.4 GM/DL (ref 6.4–8.2)
RBC # BLD AUTO: 4 X10(6)/MCL (ref 4.2–5.4)
SODIUM SERPL-SCNC: 140 MMOL/L (ref 136–145)
WBC # SPEC AUTO: 6.9 X10(3)/MCL (ref 4.5–11.5)

## 2019-08-30 LAB
APPEARANCE, UA: ABNORMAL
BACTERIA SPEC CULT: ABNORMAL /HPF
BILIRUB UR QL STRIP: ABNORMAL
COLOR UR: ABNORMAL
GLUCOSE (UA): NEGATIVE
HGB UR QL STRIP: ABNORMAL
KETONES UR QL STRIP: NEGATIVE
LEUKOCYTE ESTERASE UR QL STRIP: ABNORMAL
NITRITE UR QL STRIP: POSITIVE
PH UR STRIP: 6 [PH] (ref 5–7)
PROT UR QL STRIP: ABNORMAL
RBC #/AREA URNS HPF: 0 /[HPF]
SP GR UR STRIP: 1.02 (ref 1–1.03)
SQUAMOUS EPITHELIAL, UA: ABNORMAL /LPF
UROBILINOGEN UR STRIP-ACNC: NEGATIVE
WBC #/AREA URNS HPF: ABNORMAL /HPF

## 2019-09-02 LAB
ABS NEUT (OLG): 4.04 X10(3)/MCL (ref 2.1–9.2)
ALBUMIN SERPL-MCNC: 3 GM/DL (ref 3.4–5)
ALBUMIN/GLOB SERPL: 1 RATIO (ref 1–2)
ALP SERPL-CCNC: 60 UNIT/L (ref 45–117)
ALT SERPL-CCNC: 93 UNIT/L (ref 13–56)
AST SERPL-CCNC: 50 UNIT/L (ref 15–37)
BASOPHILS # BLD AUTO: 0.03 X10(3)/MCL (ref 0–0.2)
BASOPHILS NFR BLD AUTO: 0.4 % (ref 0–1)
BILIRUB SERPL-MCNC: 0.3 MG/DL (ref 0.2–1)
BILIRUBIN DIRECT+TOT PNL SERPL-MCNC: 0.1 MG/DL (ref 0–0.2)
BILIRUBIN DIRECT+TOT PNL SERPL-MCNC: 0.2 MG/DL (ref 0–1)
BUN SERPL-MCNC: 28 MG/DL (ref 7–18)
CALCIUM SERPL-MCNC: 10 MG/DL (ref 8.5–10.1)
CHLORIDE SERPL-SCNC: 114 MMOL/L (ref 98–107)
CO2 SERPL-SCNC: 29 MMOL/L (ref 21–32)
CREAT SERPL-MCNC: 0.96 MG/DL (ref 0.55–1.02)
EOSINOPHIL # BLD AUTO: 0.12 X10(3)/MCL (ref 0–0.9)
EOSINOPHIL NFR BLD AUTO: 1.8 % (ref 0–6.4)
ERYTHROCYTE [DISTWIDTH] IN BLOOD BY AUTOMATED COUNT: 13.7 % (ref 11.5–17)
GLOBULIN SER-MCNC: 3 GM/DL (ref 2–4)
GLUCOSE SERPL-MCNC: 171 MG/DL (ref 74–106)
HCT VFR BLD AUTO: 38.1 % (ref 37–47)
HGB BLD-MCNC: 12.6 GM/DL (ref 12–16)
IMM GRANULOCYTES # BLD AUTO: 0.13 10*3/UL (ref 0–0.02)
IMM GRANULOCYTES NFR BLD AUTO: 1.9 % (ref 0–0.43)
LYMPHOCYTES # BLD AUTO: 1.61 X10(3)/MCL (ref 0.6–4.6)
LYMPHOCYTES NFR BLD AUTO: 24.1 % (ref 16–44)
MCH RBC QN AUTO: 33.2 PG (ref 27–31)
MCHC RBC AUTO-ENTMCNC: 33.1 GM/DL (ref 33–36)
MCV RBC AUTO: 100.5 FL (ref 80–94)
MONOCYTES # BLD AUTO: 0.74 X10(3)/MCL (ref 0.1–1.3)
MONOCYTES NFR BLD AUTO: 11.1 % (ref 4–12.1)
NEUTROPHILS # BLD AUTO: 4.04 X10(3)/MCL (ref 2.1–9.2)
NEUTROPHILS NFR BLD AUTO: 60.7 % (ref 43–73)
NRBC BLD AUTO-RTO: 0 % (ref 0–0.2)
PLATELET # BLD AUTO: 157 X10(3)/MCL (ref 130–400)
PMV BLD AUTO: 11.2 FL (ref 7.4–10.4)
POTASSIUM SERPL-SCNC: 4.2 MMOL/L (ref 3.5–5.1)
PREALB SERPL-MCNC: 21.2 MG/DL (ref 20–40)
PROT SERPL-MCNC: 6.4 GM/DL (ref 6.4–8.2)
RBC # BLD AUTO: 3.79 X10(6)/MCL (ref 4.2–5.4)
SODIUM SERPL-SCNC: 148 MMOL/L (ref 136–145)
WBC # SPEC AUTO: 6.7 X10(3)/MCL (ref 4.5–11.5)

## 2019-09-03 LAB
BUN SERPL-MCNC: 30 MG/DL (ref 7–18)
CALCIUM SERPL-MCNC: 9.6 MG/DL (ref 8.5–10.1)
CHLORIDE SERPL-SCNC: 113 MMOL/L (ref 98–107)
CO2 SERPL-SCNC: 29 MMOL/L (ref 21–32)
CREAT SERPL-MCNC: 1.01 MG/DL (ref 0.55–1.02)
CREAT/UREA NIT SERPL: 30
GLUCOSE SERPL-MCNC: 136 MG/DL (ref 74–106)
POTASSIUM SERPL-SCNC: 3.9 MMOL/L (ref 3.5–5.1)
SODIUM SERPL-SCNC: 149 MMOL/L (ref 136–145)

## 2019-09-05 ENCOUNTER — HISTORICAL (OUTPATIENT)
Dept: ADMINISTRATIVE | Facility: HOSPITAL | Age: 69
End: 2019-09-05

## 2019-09-05 LAB
BUN SERPL-MCNC: 24 MG/DL (ref 7–18)
CALCIUM SERPL-MCNC: 9.6 MG/DL (ref 8.5–10.1)
CHLORIDE SERPL-SCNC: 113 MMOL/L (ref 98–107)
CO2 SERPL-SCNC: 32 MMOL/L (ref 21–32)
CREAT SERPL-MCNC: 0.66 MG/DL (ref 0.55–1.02)
CREAT/UREA NIT SERPL: 36
GLUCOSE SERPL-MCNC: 133 MG/DL (ref 74–106)
POTASSIUM SERPL-SCNC: 3.5 MMOL/L (ref 3.5–5.1)
SODIUM SERPL-SCNC: 149 MMOL/L (ref 136–145)

## 2019-09-09 ENCOUNTER — HISTORICAL (OUTPATIENT)
Dept: ADMINISTRATIVE | Facility: HOSPITAL | Age: 69
End: 2019-09-09

## 2019-09-09 LAB
ABS NEUT (OLG): 6.18 X10(3)/MCL (ref 2.1–9.2)
ABS NEUT (OLG): 6.94 X10(3)/MCL (ref 2.1–9.2)
ALBUMIN SERPL-MCNC: 2.9 GM/DL (ref 3.4–5)
ALBUMIN SERPL-MCNC: 3.2 GM/DL (ref 3.4–5)
ALBUMIN/GLOB SERPL: 0.7 RATIO (ref 1–2)
ALBUMIN/GLOB SERPL: 0.8 {RATIO}
ALP SERPL-CCNC: 63 UNIT/L (ref 45–117)
ALP SERPL-CCNC: 68 UNIT/L (ref 38–126)
ALT SERPL-CCNC: 55 UNIT/L (ref 13–56)
ALT SERPL-CCNC: 59 UNIT/L (ref 12–78)
AMPHET UR QL SCN: NEGATIVE
APPEARANCE, UA: ABNORMAL
APTT PPP: 25.8 SECOND(S) (ref 24.2–33.9)
AST SERPL-CCNC: 34 UNIT/L (ref 15–37)
AST SERPL-CCNC: 34 UNIT/L (ref 15–37)
BACTERIA SPEC CULT: ABNORMAL /HPF
BARBITURATE SCN PRESENT UR: NEGATIVE
BASOPHILS # BLD AUTO: 0 X10(3)/MCL (ref 0–0.2)
BASOPHILS # BLD AUTO: 0.03 X10(3)/MCL (ref 0–0.2)
BASOPHILS NFR BLD AUTO: 0 %
BASOPHILS NFR BLD AUTO: 0.3 % (ref 0–1)
BENZODIAZ UR QL SCN: NEGATIVE
BILIRUB SERPL-MCNC: 0.4 MG/DL (ref 0.2–1)
BILIRUB SERPL-MCNC: 0.5 MG/DL (ref 0.2–1)
BILIRUB UR QL STRIP: NEGATIVE
BILIRUBIN DIRECT+TOT PNL SERPL-MCNC: 0.1 MG/DL (ref 0–0.2)
BILIRUBIN DIRECT+TOT PNL SERPL-MCNC: 0.2 MG/DL (ref 0–0.2)
BILIRUBIN DIRECT+TOT PNL SERPL-MCNC: 0.3 MG/DL (ref 0–0.8)
BILIRUBIN DIRECT+TOT PNL SERPL-MCNC: 0.3 MG/DL (ref 0–1)
BUN SERPL-MCNC: 32 MG/DL (ref 7–18)
BUN SERPL-MCNC: 33 MG/DL (ref 7–18)
CALCIUM SERPL-MCNC: 10.3 MG/DL (ref 8.5–10.1)
CALCIUM SERPL-MCNC: 9.8 MG/DL (ref 8.5–10.1)
CANNABINOIDS UR QL SCN: NEGATIVE
CHLORIDE SERPL-SCNC: 105 MMOL/L (ref 98–107)
CHLORIDE SERPL-SCNC: 115 MMOL/L (ref 98–107)
CO2 SERPL-SCNC: 29 MMOL/L (ref 21–32)
CO2 SERPL-SCNC: 30 MMOL/L (ref 21–32)
COCAINE UR QL SCN: NEGATIVE
COLOR UR: ABNORMAL
CREAT SERPL-MCNC: 0.72 MG/DL (ref 0.55–1.02)
CREAT SERPL-MCNC: 0.82 MG/DL (ref 0.55–1.02)
EOSINOPHIL # BLD AUTO: 0 X10(3)/MCL (ref 0–0.9)
EOSINOPHIL # BLD AUTO: 0.05 X10(3)/MCL (ref 0–0.9)
EOSINOPHIL NFR BLD AUTO: 0 %
EOSINOPHIL NFR BLD AUTO: 0.6 % (ref 0–6.4)
ERYTHROCYTE [DISTWIDTH] IN BLOOD BY AUTOMATED COUNT: 13.9 % (ref 11.5–17)
ERYTHROCYTE [DISTWIDTH] IN BLOOD BY AUTOMATED COUNT: 13.9 % (ref 11.5–17)
ETHANOL SERPL-MCNC: 3 MG/DL (ref 0–3)
GLOBULIN SER-MCNC: 3.9 GM/DL (ref 2.4–3.5)
GLOBULIN SER-MCNC: 4 GM/DL (ref 2–4)
GLUCOSE (UA): NEGATIVE
GLUCOSE SERPL-MCNC: 192 MG/DL (ref 74–106)
GLUCOSE SERPL-MCNC: 240 MG/DL (ref 74–106)
HCT VFR BLD AUTO: 38.2 % (ref 37–47)
HCT VFR BLD AUTO: 42.3 % (ref 37–47)
HGB BLD-MCNC: 12.6 GM/DL (ref 12–16)
HGB BLD-MCNC: 13.4 GM/DL (ref 12–16)
HGB UR QL STRIP: ABNORMAL
IMM GRANULOCYTES # BLD AUTO: 0.08 10*3/UL (ref 0–0.02)
IMM GRANULOCYTES NFR BLD AUTO: 0.9 % (ref 0–0.43)
INR PPP: 1.1 (ref 0–1.3)
KETONES UR QL STRIP: ABNORMAL
LEUKOCYTE ESTERASE UR QL STRIP: ABNORMAL
LYMPHOCYTES # BLD AUTO: 1.5 X10(3)/MCL (ref 0.6–4.6)
LYMPHOCYTES # BLD AUTO: 1.75 X10(3)/MCL (ref 0.6–4.6)
LYMPHOCYTES NFR BLD AUTO: 16 %
LYMPHOCYTES NFR BLD AUTO: 19.6 % (ref 16–44)
MCH RBC QN AUTO: 31.9 PG (ref 27–31)
MCH RBC QN AUTO: 32.2 PG (ref 27–31)
MCHC RBC AUTO-ENTMCNC: 31.7 GM/DL (ref 33–36)
MCHC RBC AUTO-ENTMCNC: 33 GM/DL (ref 33–36)
MCV RBC AUTO: 100.7 FL (ref 80–94)
MCV RBC AUTO: 97.7 FL (ref 80–94)
MONOCYTES # BLD AUTO: 0.8 X10(3)/MCL (ref 0.1–1.3)
MONOCYTES # BLD AUTO: 0.86 X10(3)/MCL (ref 0.1–1.3)
MONOCYTES NFR BLD AUTO: 8 %
MONOCYTES NFR BLD AUTO: 9.6 % (ref 4–12.1)
NEUTROPHILS # BLD AUTO: 6.18 X10(3)/MCL (ref 2.1–9.2)
NEUTROPHILS # BLD AUTO: 6.94 X10(3)/MCL (ref 2.1–9.2)
NEUTROPHILS NFR BLD AUTO: 69 % (ref 43–73)
NEUTROPHILS NFR BLD AUTO: 74 %
NITRITE UR QL STRIP: NEGATIVE
NRBC BLD AUTO-RTO: 0 % (ref 0–0.2)
OPIATES UR QL SCN: NEGATIVE
PCP UR QL: NEGATIVE
PH UR STRIP.AUTO: 5 [PH] (ref 5–7.5)
PH UR STRIP: 5 [PH] (ref 5–9)
PLATELET # BLD AUTO: 179 X10(3)/MCL (ref 130–400)
PLATELET # BLD AUTO: 202 X10(3)/MCL (ref 130–400)
PMV BLD AUTO: 11.6 FL (ref 7.4–10.4)
PMV BLD AUTO: 11.6 FL (ref 9.4–12.4)
POTASSIUM SERPL-SCNC: 3.5 MMOL/L (ref 3.5–5.1)
POTASSIUM SERPL-SCNC: 3.5 MMOL/L (ref 3.5–5.1)
PREALB SERPL-MCNC: 17.5 MG/DL (ref 20–40)
PROT SERPL-MCNC: 6.8 GM/DL (ref 6.4–8.2)
PROT SERPL-MCNC: 7.1 GM/DL (ref 6.4–8.2)
PROT UR QL STRIP: ABNORMAL
PROTHROMBIN TIME: 14.5 SECOND(S) (ref 12–14)
RBC # BLD AUTO: 3.91 X10(6)/MCL (ref 4.2–5.4)
RBC # BLD AUTO: 4.2 X10(6)/MCL (ref 4.2–5.4)
RBC #/AREA URNS HPF: ABNORMAL /[HPF]
SODIUM SERPL-SCNC: 146 MMOL/L (ref 136–145)
SODIUM SERPL-SCNC: 151 MMOL/L (ref 136–145)
SP GR FLD REFRACTOMETRY: 1.02 (ref 1–1.03)
SP GR UR STRIP: 1.02 (ref 1–1.03)
SQUAMOUS EPITHELIAL, UA: ABNORMAL
UROBILINOGEN UR STRIP-ACNC: 0.2
WBC # SPEC AUTO: 9 X10(3)/MCL (ref 4.5–11.5)
WBC # SPEC AUTO: 9.4 X10(3)/MCL (ref 4.5–11.5)
WBC #/AREA URNS HPF: 1 /HPF (ref 0–3)

## 2019-09-10 LAB
ABS NEUT (OLG): 10.88 X10(3)/MCL (ref 2.1–9.2)
BASOPHILS # BLD AUTO: 0 X10(3)/MCL (ref 0–0.2)
BASOPHILS NFR BLD AUTO: 0 %
BUN SERPL-MCNC: 32 MG/DL (ref 7–18)
CALCIUM SERPL-MCNC: 9.8 MG/DL (ref 8.5–10.1)
CHLORIDE SERPL-SCNC: 109 MMOL/L (ref 98–107)
CO2 SERPL-SCNC: 28 MMOL/L (ref 21–32)
CREAT SERPL-MCNC: 0.68 MG/DL (ref 0.55–1.02)
CREAT/UREA NIT SERPL: 47.1
ERYTHROCYTE [DISTWIDTH] IN BLOOD BY AUTOMATED COUNT: 13.6 % (ref 11.5–17)
GLUCOSE SERPL-MCNC: 251 MG/DL (ref 74–106)
HCT VFR BLD AUTO: 38.7 % (ref 37–47)
HGB BLD-MCNC: 12.4 GM/DL (ref 12–16)
LYMPHOCYTES # BLD AUTO: 1.6 X10(3)/MCL (ref 0.6–4.6)
LYMPHOCYTES NFR BLD AUTO: 12 %
MCH RBC QN AUTO: 31.8 PG (ref 27–31)
MCHC RBC AUTO-ENTMCNC: 32 GM/DL (ref 33–36)
MCV RBC AUTO: 99.2 FL (ref 80–94)
MONOCYTES # BLD AUTO: 0.6 X10(3)/MCL (ref 0.1–1.3)
MONOCYTES NFR BLD AUTO: 5 %
NEUTROPHILS # BLD AUTO: 10.88 X10(3)/MCL (ref 2.1–9.2)
NEUTROPHILS NFR BLD AUTO: 80 %
PLATELET # BLD AUTO: 188 X10(3)/MCL (ref 130–400)
PMV BLD AUTO: 11.7 FL (ref 9.4–12.4)
POTASSIUM SERPL-SCNC: 3.9 MMOL/L (ref 3.5–5.1)
RBC # BLD AUTO: 3.9 X10(6)/MCL (ref 4.2–5.4)
SODIUM SERPL-SCNC: 146 MMOL/L (ref 136–145)
WBC # SPEC AUTO: 13.5 X10(3)/MCL (ref 4.5–11.5)

## 2019-09-11 LAB
HCO3 UR-SCNC: 28.5 MMOL/L (ref 22–26)
O2 HGB ARTERIAL: 92.5 % (ref 94–97)
PCO2 BLDA: 42 MMHG (ref 35–45)
PH SMN: 7.44 [PH] (ref 7.35–7.45)
PO2 BLDA: 62 MMHG (ref 80–100)
POC ALLENS TEST: ABNORMAL
POC BE: 3.9 (ref -2–3)
POC CAO2: 16.3 ML/DL (ref 15.7–21.6)
POC CO HGB: 2 %
POC CO2: 29.8 MMOL/L (ref 22–27)
POC IONIZED CALCIUM: 1.28 MMOL/L (ref 1.12–1.23)
POC MET HGB: 1.1 % (ref 0.4–1.5)
POC SAMPLESOURCE: ABNORMAL
POC SATURATED O2: 92.3 % (ref 96–97)
POC SITE: ABNORMAL
POC THB: 12.5 GM/DL (ref 12–16)
POC TREATMENT: ABNORMAL
POTASSIUM BLD-SCNC: 3.4 MMOL/L (ref 3.6–5)
SETTING 1 ABG: ABNORMAL
SETTING 2 ABG: ABNORMAL
SETTING 3 ABG: ABNORMAL
SETTING 4 ABG: ABNORMAL
SODIUM BLD-SCNC: 149 MMOL/L (ref 137–145)

## 2019-09-13 LAB
ABS NEUT (OLG): 5.92 X10(3)/MCL (ref 2.1–9.2)
APPEARANCE, UA: ABNORMAL
BACTERIA SPEC CULT: ABNORMAL /HPF
BASOPHILS # BLD AUTO: 0 X10(3)/MCL (ref 0–0.2)
BASOPHILS NFR BLD AUTO: 0 %
BILIRUB UR QL STRIP: NEGATIVE
BUN SERPL-MCNC: 31 MG/DL (ref 7–18)
CALCIUM SERPL-MCNC: 8.9 MG/DL (ref 8.5–10.1)
CHLORIDE SERPL-SCNC: 114 MMOL/L (ref 98–107)
CO2 SERPL-SCNC: 29 MMOL/L (ref 21–32)
COLOR UR: YELLOW
CREAT SERPL-MCNC: 0.62 MG/DL (ref 0.55–1.02)
CREAT/UREA NIT SERPL: 50
ERYTHROCYTE [DISTWIDTH] IN BLOOD BY AUTOMATED COUNT: 13.7 % (ref 11.5–17)
GLUCOSE (UA): ABNORMAL
GLUCOSE SERPL-MCNC: 262 MG/DL (ref 74–106)
HCT VFR BLD AUTO: 35.8 % (ref 37–47)
HGB BLD-MCNC: 11.5 GM/DL (ref 12–16)
HGB UR QL STRIP: NEGATIVE
KETONES UR QL STRIP: NEGATIVE
LEUKOCYTE ESTERASE UR QL STRIP: ABNORMAL
LYMPHOCYTES # BLD AUTO: 1.3 X10(3)/MCL (ref 0.6–4.6)
LYMPHOCYTES NFR BLD AUTO: 16 %
MCH RBC QN AUTO: 32.2 PG (ref 27–31)
MCHC RBC AUTO-ENTMCNC: 32.1 GM/DL (ref 33–36)
MCV RBC AUTO: 100.3 FL (ref 80–94)
MONOCYTES # BLD AUTO: 0.6 X10(3)/MCL (ref 0.1–1.3)
MONOCYTES NFR BLD AUTO: 7 %
NEUTROPHILS # BLD AUTO: 5.92 X10(3)/MCL (ref 2.1–9.2)
NEUTROPHILS NFR BLD AUTO: 74 %
NITRITE UR QL STRIP: NEGATIVE
PH UR STRIP: 5 [PH] (ref 5–9)
PLATELET # BLD AUTO: 183 X10(3)/MCL (ref 130–400)
PMV BLD AUTO: 12.1 FL (ref 9.4–12.4)
POTASSIUM SERPL-SCNC: 3.6 MMOL/L (ref 3.5–5.1)
PROT UR QL STRIP: ABNORMAL
RBC # BLD AUTO: 3.57 X10(6)/MCL (ref 4.2–5.4)
RBC #/AREA URNS HPF: ABNORMAL /HPF
SODIUM SERPL-SCNC: 151 MMOL/L (ref 136–145)
SP GR UR STRIP: 1.03 (ref 1–1.03)
SQUAMOUS EPITHELIAL, UA: ABNORMAL
UROBILINOGEN UR STRIP-ACNC: 0.2
WBC # SPEC AUTO: 8 X10(3)/MCL (ref 4.5–11.5)
WBC #/AREA URNS HPF: 127 /HPF (ref 0–3)

## 2019-09-14 LAB
BUN SERPL-MCNC: 27 MG/DL (ref 7–18)
CALCIUM SERPL-MCNC: 8.7 MG/DL (ref 8.5–10.1)
CHLORIDE SERPL-SCNC: 112 MMOL/L (ref 98–107)
CO2 SERPL-SCNC: 25 MMOL/L (ref 21–32)
CREAT SERPL-MCNC: 0.56 MG/DL (ref 0.55–1.02)
CREAT/UREA NIT SERPL: 48.2
GLUCOSE SERPL-MCNC: 222 MG/DL (ref 74–106)
POTASSIUM SERPL-SCNC: 3.7 MMOL/L (ref 3.5–5.1)
SODIUM SERPL-SCNC: 146 MMOL/L (ref 136–145)

## 2022-09-13 NOTE — DISCHARGE SUMMARY
Patient:   Cathleen Shaikh            MRN: [id number]            FIN: [id number]               Age:   68 years     Sex:  Female     :  1950   Associated Diagnoses:   None   Author:   Monserrat Davila      Date of Service: 2019      Discharge Information      Discharge Summary Information   Date of Admission: 8/15/2019  Date of Discharge: 2019  Admit Diagnosis: Breast cancer with brain metastasis         Debility         Morbid obesity         DM type II         HTN         Dysphagia         Diabetic neuropathy         Constipation         UTI  Discharge Diagnosis: Breast cancer with brain metastasis (stable)           Debility (stable)           Morbid obesity (stable)           DM type II (stable)           HTN (stable)           Dysphagia (stable)           Diabetic neuropathy (stable)           Constipation (stable)           UTI (stable)           Hypernatremia (stable)  Internal Medicine: Silvestre Wren MD    OUTPATIENT PROVIDERS  PCP:  Deep Agustin MD  Neurosurgery:  Katie Lake MD  Radiation oncology:  Wayne Kumar MD  Medical oncology:   MD Joyec Palma MD      Hospital Course    68-year-old WF presented to [facility] ED with lethargy, generalized weakness and multiple falls.  PMH significant for right breast infiltrating ductal adenocarcinoma stage IIIa diagnosed 2016 s/p neoadjuvant AC/toxol and lumpectomy with axillary node dissection followed by adjuvant radiotherapy.  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  Decadron initiated.  Tolerated CyberKnife treatment on 3/21/2019.  ED work up significant for UTI and leukocytosis (likely steroid-induced).  Rocephin initiated.  CT head unremarkable.  Tolerated posterior fossa craniectomy with resection of cerebellar lesion/tumor using microdissection on 2019.  Increased lethargy on  and repeat CT head with no changes.  Tolerated  shunt placement   chronic hydrocephalus with ventricular dilatation on 8/9.  Mental status labile throughout inpatient course.  Repeat head CT significant for increased visibility of gyri/sulci on 8/12 indicative of functioning  shunt per neurosurgery.  Rocephin discontinued and doxycycline initiated on 8/14.  Tolerated transfer to [facility] SNF unit on 8/15 without incident.     During inpatient SNF course,  patient developed involuntary twitching on 8/19.  Head CT significant for interval decrease in pneumocephalus in the postoperative bed deep to the occipital craniectomy defect and along the right parietal entry  shunt catheter.  Stable ventricular size.  Keppra initiated at 500 mg twice daily.  Involuntary twitching improved.  MRI with and without contrast on 9/2 significant for postoperative changes of occipital craniectomy with faint linear enhancement along the margins of the left cerebellar resection cavity.  Leptomeningeal enhancement in the posterior fossa basal cisterns, sylvian fissures and along the anterior interhemispheric fissure.  This appears overall more prominent from the MRI dated 8/1/2019, however there are differences in technique.  Right parietal approach ventricular shunt catheter with stable ventricular size.  Macrobid initiated on 9/3 (end date 9/10) for UTI.  Repeat head CT on 9/5 secondary to report of headache, nausea and vomiting significant for left occipital craniotomy with improvement of overlying soft tissue inflammation.  Left cerebellar beneath craniotomy similar area of inflammatory changes with less visible associated locules.  Patient remained incontinent of bowel and bladder.  No change in physical or cognitive condition.  Therapy scores are as follows:  PT: Max to total assist with bed mobility.  OT:  Moderate assistance with gross grasp task.  Maximal assistance with car tapping.  Moderate to max assist with motor control.  Appetite and bowel regimen  "remains at goal.  Medication reconciliation completed.  BP elevated and treated prior to transfer.  Recent lab work reported below.  Sodium 151.  1/2 normal saline infusing at 75 mls an hour (x2 L ordered).  Stable for discharge to [facility] ED.      Chief Complaint: Breast cancer with brain metastasis s/p posterior fossa craniectomy with resection of cerebellar lesion 8/5/2019 and  shunt placement on 8/9/2019       Physical Examination      Vital Signs (last 24 hrs)_____  Last Charted___________  Temp Oral      36.6 DegC  (SEP 09 05:00)  Heart Rate Peripheral   75 bpm  (SEP 09 05:00)  Resp Rate          20 br/min  (SEP 09 05:00)  SBP      H 190mmHg  (SEP 09 12:30)  DBP      H 139mmHg  (SEP 09 12:30)  SpO2      96 %  (SEP 09 05:00)  Weight      110.4 kg  (SEP 09 06:00)     General:  No acute distress, Alert and oriented to self/place.  Year "2000" and doesn't know president.    Eye:  Pupils are equal, round and reactive to light, Extraocular movements are intact, Normal conjunctiva, Wears glasses.    HENT:  Normocephalic, Normal hearing, Oral mucosa is moist, Dressing to right parietal region with Steri-Strips to posterior head.    Neck:  Supple.    Respiratory:  Lungs are clear to auscultation, Respirations are non-labored, Breath sounds are equal, Symmetrical chest wall expansion, Decreased bibasilar breath sounds.    Cardiovascular:  Regular rhythm, No murmur, Tachycardia, Good pulses equal in all extremities, Normal peripheral perfusion.    Gastrointestinal:  Soft, Non-tender, Normal bowel sounds, Obese.    Musculoskeletal:  FROM to BUE/BLE, generalized weakness.    Neurologic:  Alert, oriented to self, follows commands.    Psychiatric:  Cooperative.    Cognition and Speech:  slow to respond, but speaking in clear sentences.  Confused, Mumbles at times.      Results Review   General results   Most recent results   Discrete results only   9/9/2019 5:00 CDT        Blood Glucose Stick Site  Right                       "       Blood Glucose, POC        197 mg/dL  HI    9/9/2019 3:50 CDT        WBC                       9.0 x10(3)/mcL                             RBC                       3.91 x10(6)/mcL  LOW                             Hgb                       12.6 gm/dL                             Hct                       38.2 %                             Platelet                  179 x10(3)/mcL                             MCV                       97.7 fL  HI                             MCH                       32.2 pg  HI                             MCHC                      33.0 gm/dL                             RDW                       13.9 %                             MPV                       11.6 fL  HI                             Abs Neut                  6.18 x10(3)/mcL                             Neutro Auto               69.0 %                             Lymph Auto                19.6 %                             Mono Auto                 9.6 %                             Eos Auto                  0.6 %                             Abs Eos                   0.05 x10(3)/mcL                             Basophil Auto             0.3 %                             Abs Neutro                6.18 x10(3)/mcL                             Abs Lymph                 1.75 x10(3)/mcL                             Abs Mono                  0.86 x10(3)/mcL                             Abs Baso                  0.03 x10(3)/mcL                             NRBC%                     0.0 %                             IG%                       0.900 %  HI                             IG#                       0.0800  HI                             Sodium Lvl                151 mmol/L  HI                             Potassium Lvl             3.5 mmol/L                             Chloride                  115 mmol/L  HI                             CO2                       29.0 mmol/L                             Calcium Lvl               9.8  mg/dL                             Glucose Lvl               192 mg/dL  HI                             BUN                       32.0 mg/dL  HI                             Creatinine                0.72 mg/dL                             eGFR-AA                   >60 mL/min/1.73 m2  NA                             eGFR-ALLIE                  >60 mL/min/1.73 m2  NA                             Bili Total                0.4 mg/dL                             Bili Direct               0.10 mg/dL                             Bili Indirect             0.30 mg/dL                             AST                       34 unit/L                             ALT                       55 unit/L                             Alk Phos                  63 unit/L                             Total Protein             6.8 gm/dL                             Albumin Lvl               2.9 gm/dL  LOW                             Globulin                  4 gm/dL                             A/G Ratio                 0.7 ratio  LOW                             Prealbumin                17.5 mg/dL  LOW           Discharge Plan   Discharge Summary Plan   Discharge Status: stable.        Location: Discharge to [facility] ED     Medications: See discharge medicine reconciliation     Activity:  As tolerated     Diet:   Puree diabetic      Instructions: Per [facility]      Education:  Brain Cancer.  Hypernatremia.       Follow-up:  Per [facility]     Discussed plan of care, and patient communicated understanding. Agreed to comply with recommendations.    Discharge Time: 48 minutes

## 2022-09-13 NOTE — HISTORICAL [facility] CERNER
This is a historical note converted from [place]. Formatting and pictures may have been removed.  Please reference [place] for original formatting and attached multimedia. Chief Complaint  Patient sent from Ortho ext care from [facility] since brain tumor removal 8/5 worsening yesterday, sx by Dr Quick  Reason for Consultation  AMS  History of Present Illness  68-year-old female?with a past medical history of?breast cancer?(diagnosed in 2016)?with?brain mets?(diagnosed in March 2019) S/P?CyberKnife treatment?and surgical resection?(8/5/2019)?and?shunt placement?8/9/2019.? She presented to ED on 9/9?for worsening mental status?after she was discharged from?physical rehab?and?had minimal?progression?r/t waxing and waning mental status.? CT head on admission?showed postsurgical changes?and and increasing hypodensity within the surgical bed?which could be related to edema and continued gliosis or evolution.? Neurosurgery has evaluated the patient and signed off with no further surgical intervention.? Neurology has been consulted for altered mental status.? Urine cultures growing E. coli...?patient is being treated with?IV?ceftriaxone.  ?  Today, patient is sitting up in bed?with friends at bedside.? She is awake and oriented?to self.? Confabulating?she is not reliably speaking,?multifocal myoclonus, she does not follow commands, seems to wax and wane neurologically.  Review of Systems  Unable to obtain due to clinical condition  Physical Exam  Vitals & Measurements  T:?36.8? ?C (Oral)? TMIN:?36.5? ?C (Oral)? TMAX:?37.3? ?C (Oral)? HR:?84(Peripheral)? RR:?18? BP:?142/84? SpO2:?98%?  Awake, oriented to self  Confabulating,?not speaking reliably  Multifocal myoclonus  Does not follow commands  KAN, no unilateral weakness  Visual field cuts, PERR  Assessment/Plan  Acute UTI?N39.0  Altered mental status?[id number]  Status post craniotomy?Z98.890  Orders:  EEG Routine, 09/12/19 17:18:00 CDT, Stop date  09/12/19 17:18:00 CDT  Impression and plan per MD   Problem List/Past Medical History  Ongoing  Brain metastasis  Cognitive functions  Diabetes mellitus, type II  Diabetic neuropathy  Fatigue  Fluid volume deficit risk  HTN (hypertension)  Impaired skin integrity  Malignant neoplasm of upper-outer quadrant of right breast in female, estrogen receptor positive  Mood alteration  Morbid obesity  Pain management  Postmenopausal state  Historical  Breast cancer  Dyspnea  Encounter for weight loss counseling  Left foot pain  Low TSH level  Tachycardia  Procedure/Surgical History  Laparoscopic Shunt Ventriculo Peritoneal (.) (08/09/2019)  Shunt Ventriculoperitoneal with Stealth (.) (08/09/2019)  Craniotomy Posterior Fossa Decompression (.) (08/05/2019)  Excision of Cerebellum, Open Approach (08/05/2019)  Bypass Cerebral Ventricle to Peritoneal Cavity with Synthetic Substitute, Percutaneous Approach (08/04/2019)  Inspection of Peritoneal Cavity, Percutaneous Endoscopic Approach (08/04/2019)  carpel tunnel surgery  cervical fusion  Hysterectomy  Knee replacement  Mastectomy, partial (eg, lumpectomy, tylectomy, quadrantectomy, segmentectomy);  mediport   Medications  Inpatient  acetaminophen, 650 mg= 20.3 mL, Oral, q6hr, PRN  acetaminophen, 1000 mg= 2 tab(s), Oral, q6hr, PRN  calcium carbonate, 500 mg= 1 tab(s), Oral, BID  carvedilol 12.5 mg oral tablet, 25 mg= 2 tab(s), Oral, BID  cefTRIAXone, 1 gm= 50 mL, IV Piggyback, q24hr  Colace 100 mg oral capsule, 100 mg= 1 cap(s), Oral, BID  Decadron, 4 mg= 1 mL, IV Push, BID  Dextrose 10% in Water intravenous solution, 125 mL, IV, As Directed, PRN  Dextrose 10% in Water intravenous solution, 125 mL, IV, As Directed, PRN  Dextrose 10% in Water intravenous solution, 125 mL, IV, Once, PRN  Dextrose 10% in Water intravenous solution, 250 mL, IV, As Directed, PRN  glucagon, 1 mg= 1 EA, IM, q10min, PRN  glucagon, 1 mg= 1 EA, IM, q10min, PRN  hydrALAZINE (Apresoline) Inj., 20 mg= 1 mL, IV  Push, q2hr, PRN  hydrALAZINE 25 mg oral tablet, 25 mg= 1 tab(s), Oral, TID  insulin glargine 100 units/mL subcutaneous solution, 20 units= 0.2 mL, Subcutaneous, At Bedtime  insulin lispro, 2-14 units, Subcutaneous, As Directed, PRN  Keppra 500 mg/ mL IVPB, 500 mg= 100 mL, IV Piggyback, BID  labetalol 5 mg/mL intravenous solution, 10 mg= 2 mL, IV Push, q2hr, PRN  lisinopril, 40 mg= 4 tab(s), Oral, At Bedtime  Lopressor 1 mg/mL injectable solution, 5 mg= 5 mL, IV Push, q6hr, PRN  Neurontin 300 mg oral capsule, 300 mg= 1 cap(s), Oral, TID  Venelex 788 mg-87 mg/g topical ointment, 1 iam, TOP, TID  Zeasorb-AF, 1 iam, TOP, TID  Zofran, 4 mg= 2 mL, IV Push, q4hr, PRN  Home  Actos 15 mg oral tablet, 15 mg= 1 tab(s), Oral, Daily  Balmex topical cream, apply topically, TOP, BID  calcium carbonate 500 mg oral tablet, chewable, 500 mg= 1 tab(s), Oral, BID  carvedilol 25 mg oral tablet, 25 mg= 1 tab(s), Oral, BID  Colace 100 mg oral capsule, 100 mg= 1 cap(s), Oral, BID  hydrALAZINE 25 mg oral tablet, 25 mg= 1 tab(s), Oral, TID  insulin regular, 2-10 units based on sliding scale, Subcutaneous, As Directed, PRN  Keppra 500 mg oral tablet, 500 mg= 1 tab(s), Oral, BID  Lantus 100 U/mL (per 1 unit), 15 units, Subcutaneous, Once a day (at bedtime)  lisinopril 20 mg oral tablet, 40 mg= 2 tab(s), Oral, At Bedtime  Macrobid 100 mg oral capsule, 100 mg= 1 cap(s), Oral, BID  metFORMIN 1000 mg oral tablet, Oral, BID  Multiple Vitamins with Minerals oral capsule, 1 tabs(s), Oral, Daily  Neurontin 300 mg oral capsule, 300 mg= 1 cap(s), Oral, TID  Template Non-Formulary Med,? ?Investigating  ZeaSORB, 1 iam, TOP, BID  Allergies  No Known Allergies  Social History  Abuse/Neglect  No, 09/10/2019  No, 09/10/2019  No, 09/09/2019  No, 09/09/2019  No, 08/01/2019  No, 07/05/2019  Alcohol  Employment/School  Retired, 04/24/2018  Home/Environment  Lives with Spouse., 04/24/2018  Nutrition/Health  Regular, 04/24/2018  Sexual  Sexually active: Yes.,  04/24/2018  Substance Use  Tobacco  Never (less than 100 in lifetime), N/A, 09/10/2019  Never (less than 100 in lifetime), N/A, 09/10/2019  Never (less than 100 in lifetime), N/A, 09/09/2019  Never (less than 100 in lifetime), N/A, 09/09/2019  Never (less than 100 in lifetime), N/A, 08/01/2019  Never (less than 100 in lifetime), N/A, 07/05/2019  Family History  Acute myocardial infarction.: Father.  CABG (Coronary artery bypass grafting) planned: Father, Sister and Brother.  COPD (chronic obstructive pulmonary disease).: Sister.  Diabetes mellitus type 1.: Sister.  Hypertension.: Sister.  Leukemia: Father.  Primary malignant neoplasm of lung: Mother and Brother.  Stent: Sister.  Immunizations  Vaccine Date Status   influenza virus vaccine, inactivated 11/07/2018 Recorded   tetanus/diphtheria/pertussis, acel(Tdap) 12/25/2017 Given   pneumococcal 13-valent conjugate vaccine 10/24/2017 Given   influenza virus vaccine, inactivated 10/24/2017 Given   influenza virus vaccine, inactivated 10/25/2007 Recorded   Lab Results  No new labs  Diagnostic Results  (09/09/2019 16:13 CDT CT Head W/O Contrast)  ?  Findings:  No acute intracranial hemorrhage.  Diffuse cerebral atrophy with concordant ventricular enlargement.?  Scattered hypodensities throughout the deep periventricular white  matter.  Right parietal approach ventricular catheter is again noted.?  Postsurgical changes of occipital craniotomy.?  There is increasing hypodensity within the surgical bed which may be  related to edema and continued scoliosis or evolution. Infection is  not entirely excluded on the basis of CT as there is limited  evaluation.  The mastoid air cells are clear.?  The auditory canals are patent bilaterally.  The globes and orbital contents are normal bilaterally.  The visualized maxillary, ethmoid and sphenoid sinuses are clear.  ?  Impression  No acute intracranial hemorrhage. Findings of chronic microvascular  ischemic disease.  Postsurgical  changes of occipital craniotomy.?  There is increasing hypodensity within the surgical bed which may be  related to edema and continued gliosis or evolution. Further  evaluation is limited on CT.  ?  (09/10/2019 21:30 CDT CT Abdomen and Pelvis W W/O Contrast)  ?  Findings.  Left basilar nodule on image 11 series 3 measures 16 mm, no  significant change.  ?  There is hepatic steatosis. No significant abnormality the  gallbladder, spleen, pancreas or adrenals. There is no hydronephrosis.  Left kidney nonobstructing nephrolithiasis versus vascular  calcifications.?  ?  There is no bowel obstruction. No significant inflammatory changes of  the bowel. No free air. Shunt tubing is noted. Tiny hiatal hernia.  ?  Urinary bladder is unremarkable. There is no free fluid. The abdominal  aorta is normal in caliber with moderate atherosclerotic disease.  There are no enlarged retroperitoneal lymph nodes.?  ?  There are moderate degenerative changes of the spine. No new  suspicious osseous lesion.  ?  Impression.  ?  1. No acute abdominopelvic findings.  2. Stable left lower lobe nodule.  ?  (08/31/2019 07:34 CDT MRI Brain W W/O Contrast)  DISCUSSION:  ?  There are postsurgical changes of prior occipital craniectomy. There  is faint linear enhancement around the margins of the left cerebellar  resection cavity.  ?  Again seen is leptomeningeal enhancement lining the cerebellum and  basal cisterns. There is enhancement in the bilateral internal  auditory canals and along the trigeminal nerves. Leptomeningeal  enhancement is also seen in the sylvian fissures and along the  anterior interhemispheric fissure. This appears overall progressed  from the prior MRI, however a different technique was used.  ?  There is patchy subcortical and periventricular T2/FLAIR  hyperintensity. T2/FLAIR signal also seen in the cerebellum and [place].  There is no acute infarct.  ?  There is a right parietal approach ventricular shunt catheter with  its  tip in the right frontal horn. The ventricles have not definitely  changed in size from the CT dated 8/20/2019 given differences in  technique. There is no fluid collection along the course of the  catheter.  ?  There is no midline shift or downward herniation. The major  intracranial flow voids are patent.  ?  IMPRESSION:?  1. ?Postoperative changes of occipital craniectomy with faint linear  enhancement along the margins of the left cerebellar resection cavity.  2. ?Leptomeningeal enhancement in the posterior fossa, basal cisterns,  sylvian fissures and along the anterior interhemispheric fissure. This  appears overall more prominent from the MRI dated 8/1/2019, however  there are differences in technique.  3. ?Right parietal approach ventricular shunt catheter with stable  ventricular size.?  ?     [1]?CT Head W/O Contrast; Mini FORDE, James Adams 09/09/2019 16:13 CDT  [2]?CT Abdomen and Pelvis W W/O Contrast; Sujatha FORDE, Aaron Santos 09/10/2019 21:30 CDT  [3]?MRI Brain W W/O Contrast; Mary Vallejo MD 08/31/2019 07:34 CDT   pt seen and examined in conjunction with NP  ?   neuro exam comments/ see also above NP note:  confabulatory;? Mariana; myoclonus/twitchy  ?   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   Assessment/Plan/Recs:  as above with the following addn comments (if any):  brain metastases/leptomeningeal carcinomatosis  ?  1. eeg tonight  2. I recommend hospice  3.lengthy discussion w husb in presence of nurse  ?  aim to reassess tomorrow  ?   eeg wo seiz features

## 2022-09-13 NOTE — ED PROVIDER NOTES
Patient:   Cathleen Shaikh             MRN: [id number]            FIN: [id number]               Age:   67 years     Sex:  Female     :  1950   Associated Diagnoses:   Head contusion; Abrasion of head; Fall   Author:   Tutu MONTOYA, Tammy FERGUSON      Basic Information   Time seen: Date & time 2017 12:37:00.   History source: Patient, family.   Arrival mode: Private vehicle, walking.   History limitation: None.      History of Present Illness   The patient presents following fall and Patient states trip over a box and fell backwards PTA. states that she hit her posterior head. denies any LOC. states posterior head abrasion. states that she is currently on chemo for breast CA (LINDSAY keys). .  The onset was just prior to arrival.  The occurrence was single episode.  The fall was described as lost balance, fell from ground level.  The location where the incident occurred was at home.  Location: Posterior head. The character of symptoms is pain.  The degree at present is 5 /10.  The exacerbating factor is none.  The relieving factor is none.  Risk factors consist of none.  Therapy today: none.  Preceding symptoms none.  Associated symptoms: denies chest pain, denies dizziness, denies syncope, denies headache, denies nausea, denies vomiting, denies abdominal pain and denies loss of consciousness.  Additional history: none.        Review of Systems   Constitutional symptoms:  Negative except as documented in HPI.   Skin symptoms:  Negative except as documented in HPI.   Eye symptoms:  Negative except as documented in HPI.   ENMT symptoms:  Negative except as documented in HPI.   Respiratory symptoms:  Negative except as documented in HPI.   Cardiovascular symptoms:  Negative except as documented in HPI.   Gastrointestinal symptoms:  Negative except as documented in HPI.   Genitourinary symptoms:  Negative except as documented in HPI.   Musculoskeletal symptoms:  Negative except as documented in HPI.   Neurologic  symptoms:  Negative except as documented in HPI.   Psychiatric symptoms:  Negative except as documented in HPI.   Allergy/immunologic symptoms:  Negative except as documented in HPI.             Additional review of systems information: All other systems reviewed and otherwise negative, All systems reviewed as documented in chart.      Health Status   Allergies:    Allergic Reactions (Selected)  No Known Allergies,    Allergies (1) Active Reaction  No Known Allergies None Documented  .   Medications:  (Selected)   Prescriptions  Prescribed  hydrochlorothiazide 25 mg oral tablet: 25 mg = 1 tab(s), Oral, Daily, # 30 tab(s), 11 Refill(s), Pharmacy: [facility], [facility]  Documented Medications  Documented  Ativan 1 mg oral tablet: See Instructions, 1 tab(s) Oral night before and morning of chemotherapy and every 8 hrs PRN nausea or anxiety, # 30 tab(s), 2 Refill(s)  Coreg 6.25 mg oral tablet: 6.25 mg = 1 tab(s), Oral, BID, # 180 tab(s), 0 Refill(s)  Glucophage 500 mg oral tablet: 500 mg = 1 tab(s), Oral, BID, # 60 tab(s), 0 Refill(s)  Lantus 100 U/mL (per 1 unit): 50 units, Subcutaneous, BID, 0 Refill(s)  Lasix: 40 mg, Oral, Daily, PRN PRN swelling, 0 Refill(s)  Neurontin 100 mg oral capsule: See Instructions, take 1 at non and 2-3 prn po hs, 0 Refill(s)  Norco 10 mg-325 mg oral tablet: 1 tab(s), Oral, q4hr, PRN PRN as needed for pain, 0 Refill(s)  Norvasc 10 mg oral tablet: 10 mg = 1 tab(s), Oral, Daily, # 30 tab(s), 0 Refill(s)  Vitamin B Complex oral capsule: 1 cap(s), Oral, Daily, 0 Refill(s)  Vitamin B6 250 mg oral tablet: 1, Oral, Daily, 0 Refill(s)  Zestril 20 mg oral tablet: 20 mg = 1 tab(s), Oral, Daily, # 30 tab(s), 0 Refill(s)  Zofran 8 mg oral tablet: See Instructions, PRN PRN nausea, 1 tab(s) Oral TID for 3 days after chemotherapy and every 8 hours PRN nausea, # 30, 4 Refill(s)  glutamine oral powder for reconstitution: 20 gms, Oral, Daily, 0 Refill(s)  loratadine 10 mg oral tablet: See  Instructions, 1 tab(s) Oral Daily for 5 days after chemotherapy, 0 Refill(s)  metoclopramide 10 mg oral tablet: See Instructions, 1 tab(s) Oral TID for 3 days after chemotherapy, # 36, 0 Refill(s)  naproxen sodium 220 mg oral tablet: See Instructions, 2 tab(s) Oral BID for 3 days after chemotherapy, 0 Refill(s)  promethazine 25 mg oral tablet: 25 mg = 1 tab(s), Oral, q4hr, PRN PRN for nausea/vomiting, # 30 tab(s), 2 Refill(s).   Immunizations: Unknown.      Past Medical/ Family/ Social History   Medical history:    No active or resolved past medical history items have been selected or recorded., Reviewed as documented in chart.   Surgical history:    Knee replacement ([id number]).  Comments:  10/24/2017 10:11 - Shae Dc LPN  Bilateral knee replacements.  Hysterectomy ([id number]).  Comments:  10/24/2017 10:12 - Shae Dc LPN  Partial hysterectomy, Reviewed as documented in chart.   Family history:    Stent  Sister  Primary malignant neoplasm of lung  Mother  Brother  Leukemia  Father  Acute myocardial infarction.  Father  Hypertension.  Sister  Diabetes mellitus type 1.  Sister  COPD (chronic obstructive pulmonary disease).  Sister  CABG (Coronary artery bypass grafting) planned  Father  Brother  Sister  , Reviewed as documented in chart.   Social history: Alcohol use: Denies, Tobacco use: Denies, Drug use: Denies, Occupation: Employed.      Physical Examination               Vital Signs   Vital Signs   12/25/2017 12:36 CST     Temperature Temporal Artery               36.5 DegC                             Peripheral Pulse Rate     132 bpm  HI                             Respiratory Rate          18 br/min                             SpO2                      99 %                             Oxygen Therapy            Room air  .      Vital Signs (last 24 hrs)_____  Last Charted___________  Heart Rate Peripheral   H 132bpm  (DEC 25 12:36)  Resp Rate         18 br/min  (DEC 25 12:36)  SpO2      99 %   (DEC 25 12:36)  .   General:  Alert, no acute distress.    Hannibal coma scale:  Eye response: 4 /4, verbal response: 5 /5, motor response: 6 /6, Total score: Total score: 15.    Neurological:  Alert and oriented to person, place, time, and situation, No focal neurological deficit observed, CN II-XII intact, normal sensory observed, normal motor observed, normal speech observed, normal coordination observed.    Skin:  Warm, dry, pink, intact.    Head:  Normocephalic, On exam: Parietal, hematoma that is approximately 3x3 cm with an abrasion. .    Neck:  Supple, trachea midline, no tenderness, no step offs, full range of motion, No stiffness,    Eye:  Pupils are equal, round and reactive to light, extraocular movements are intact, normal conjunctiva, vision unchanged.    Ears, nose, mouth and throat:  Oral mucosa moist.   Cardiovascular:  Regular rate and rhythm, No murmur, Normal peripheral perfusion, No edema.    Respiratory:  Lungs are clear to auscultation, respirations are non-labored, breath sounds are equal, Symmetrical chest wall expansion.    Back:  Nontender, Normal range of motion, Normal alignment, no step-offs.    Musculoskeletal:  All other adjacent joints normal, Proximal upper extremity: Left, posterior, elbow, aligned, range of motion normal, no tenderness, no swelling, no abrasion, no erythema, no ecchymosis, no deformity.    Chest wall   Gastrointestinal:  Soft, Nontender, Non distended, Normal bowel sounds.    Genitourinary   Lymphatics   Psychiatric:  Cooperative, appropriate mood & affect, normal judgment.       Medical Decision Making   Differential Diagnosis:  Fall, abrasion, contusion, closed fracture, head injury.    Documents reviewed:  Emergency department nurses' notes.   Radiology results:  Rad Results (ST)  < 12 hrs   Accession: [id number]  Order: CT Cervical Spine W/O Contrast  Report Dt/Tm: 12/25/2017 13:40  Report:   History: Trauma  Comparison studies:  None     Technique:   Axial  CT images were obtained through the cervical region.  Coronal and sagittal reconstructions obtained from the axial data.  Automatic exposure control was utilized to limit radiation dose.  Contrast: None.     Radiation Dose:   Total DLP: 573 mGy*cm     DISCUSSION:     Fractures: None.  Alignment: Straightening of the normal lordosis. No subluxations.  Soft tissues: No abnormalities.     Degenerative changes:  Mild degenerative canal stenosis at C6-7 related to disc osteophyte  complex.  Mild left foraminal narrowing at C4-5 related to uncovertebral and  facet hypertrophy.     Additional findings:  Post fusion changes at C5-6.  Right thyroid 1.2 cm hypodense nodule.     IMPRESSION:     1. No fractures.     2. Ligament, spinal cord and/or vascular abnormalities cannot be  excluded on the basis of this examination.      Accession: [id number]  Order: CT Head W/O Contrast  Report Dt/Tm: 12/25/2017 13:38  Report:   CT Head W/O Contrast     REASON FOR EXAM:  Head Injury     Total DLP 1125 mGy*cm     COMPARISON: None.     FINDINGS:     Minimal hyperdensity is seen in the lower anterior interhemispheric  area which may indicate minimal hemorrhage. No other evidence of  intracranial hemorrhage. Ventricles are within normal limits. There is  moderate bilateral frontal lobe atrophy. No abnormal intra-axial  attenuation noted. No evidence of skull fracture.     IMPRESSION:     Possible minimal anterior interhemispheric fissure hemorrhage.     Findings given to Dr. Asif at 1:35 PM 12/25/2017.      .      Reexamination/ Reevaluation   Assessment: Discussed patient with Dr. Asif. Dr. Asif saw and examined patient. He spoke with neurosurgery on-call, Dr. Ramos. Dr. Ramos reviewed CT and recommended that patient may be discharged home due to no acute injury on CT on his review. instructed patient to return immediately to the ED if any nausea, vomiting, seizure, refractory headache, or any worsening. patient and her   stated understanding.  .      Impression and Plan   Diagnosis   Head contusion (ERR93-QU S00.93XA)   Abrasion of head (BQF89-KJ S00.91XA)   Fall (WVO18-FP W19.XXXA)   Plan   Condition: Stable.    Disposition: Discharged: Time  12/25/2017 14:44:00, to home.    Prescriptions: Launch prescriptions   Pharmacy:  mupirocin 2% topical oint (Prescribe): 1 iam, TOP, TID, apply a thin film, X 5 day(s), # 22 gm, 0 Refill(s)  Fioricet oral capsule (Prescribe): 1 cap(s), Oral, q4hr, PRN PRN as needed for headache, # 15 cap(s), 0 Refill(s).    Patient was given the following educational materials: Abrasion, Contusion, Head Injury, Adult, Head Injury, Adult, Contusion, Abrasion.    Follow up with: Anytime the conditions worsen, return to , only if needed Return to ED if symptoms worsen; Follow up with primary care provider Within 1 to 2 days Call for followup appointment, Follow up with primary care provider Within 1 to 2 days Call for followup appointment; Anytime the conditions worsen, return to , only if needed Return to ED if symptoms worsen; DO NOT TAKE ANY ASPIRIN, BLOOD THINNERS, OR NSAIDS X 14 DAYS..    Counseled: Patient, Regarding diagnosis, Regarding diagnostic results, Regarding treatment plan, Regarding prescription.

## 2022-09-13 NOTE — DISCHARGE SUMMARY
Patient:   Cathleen Shaikh            MRN: [id number]            FIN: [id number]               Age:   68 years     Sex:  Female     :  1950   Associated Diagnoses:   None   Author:   Phil FORDE, Jordi CORTES      HOSPITALIST DISCHARGE SUMMARY/DR VILLARREAL  ADMISSION 19 TO 8/15/19  ER MD:MARIPOSA WARNER MD  HOSPITALIST: MARCO FORDE/PHIL FORDE/TRENT FORDE/ABBEY FORDE  CONSULTANTS: DR [facility]      This is a 68-year-old female with history of right breast infiltrating ductal adenocarcinoma stage IIIa diagnosed 10/2017 status post  neoadjuvant AC/toxol and lumpectomy with axillary node dissection followed by adjuvant XRT. Found to have solitary brain metastasis in 2019 in the left posterior fossa with vasogenic edema started on dexamethasone and received CyberKnife treatment on 3/21/2019.  She is planned for neurosurgical resection with Dr. Lake this 2019.  She presented to the ER with her , complaining that she has been feeling lethargic and generally weak and falling multiple times.  This has been worsening over the past month. She denies any pain, fever, chills, no focal extremity weakness or numbness.  This morning she has an MRI brain as part of her preop work-up- report pending.  On arrival to ED, she was hemodynamically stable and afebrile.  Labs notable for hyperglycemia , leukocytosis (likely steroid-induced), urinalysis suggestive of UTI(). CT head with no acute findings.       Pt was placed on rocephin for suspected uti(poa) , final urine cx pertinent for klebsiella pneumonia sensitive to rocephin.     On  pt underwent posterior fossa craniotomy for solitary metastatic lesion, afterwards she was admitted to icu for further monitoring/post op care. Pt placed on steroids.     Post op ct head showed  chronic hydrocephalus with ventricular dilation. Pt required  shunt placement with improvement in lethargy/loc. Pt transferred to hospitalist services  again       For the next couple of days her loc went from nl to hypoactive at times depending on activities, also has some issues with short term memory in spite of adequate tx for uti(poa). Ct brain repeated  with normal post    op/ shunt  changes . BP controlled/glycemia improved. Her encephalopathy related to recent craniotomy/steroids/uti/ shunt/chronic hydrocephalus...etc. Pt will be transfer on doxycycline x 3 more days for uti(poa). Steroids as per NS.            VSS  Physical Exam   General: obese female, confused but in no acute distress  Respiratory: clear to auscultation bilaterally  Cardiovascular: regular rate and rhythm without murmurs, gallops or rubs, no edema  Gastrointestinal: soft, non-tender, non-distended with normal bowel sounds, without masses to palpation  Neurologic: cranial nerves intact, moves all extremities - 4/5 strength on L UE, L LE, 5/5 on Right      ct head:  Impression:  1. No acute intracranial hemorrhage is seen.  2. Again noted is left suboccipital craniectomy with multiple gas  locules at the surgical bed/ left cerebellum. There is mild  pneumocephalus in the right anterior temporal region, new since the  prior examination and possibly related to recent shunt placement.  3. There is an interval placement of a ventriculoperitoneal shunt  catheter through a right parietal access with its tip in the frontal  horn of the right lateral ventricle. There is mild hydrocephalus,  grossly unchanged since the prior examination.  4. Details as above.    DISCHARGE DIAGNOSIS INCLUDES:  IDDM2- HEMOGLOBIN A1C OF 8  General weakness R53.1  Idiopathic normal pressure hydrocephalus G91.2-post  shunt  UTI(POA)-klebsiella POST ROECPHIN, DC ON DOXY  Encephalopathy related to recent surgery, UTI, steroids  essential hypertension  history of right breast infiltrating ductal adenocarcinoma stage IIIa diagnosed 10/2017 status post  neoadjuvant AC/toxol and lumpectomy with axillary node dissection  followed by adjuvant XRT  solitary brain metastasis in March 2019 in the left posterior fossa with vasogenic edema post craniotomy resection 8-5-20  cognitive dysfunction/short term memory impairment  FAILURE TO THRIVE  RECURRENT FALLS  LETHARGY    DISCHARGE SUMMARY GREATER THAN 35 MINUTES      Pt was evaluated by PT. Accepted for C- TCU.

## 2022-09-13 NOTE — CONSULTS
DATE OF CONSULTATION:      ATTENDING PHYSICIAN:  Ludwin Waters MD  CONSULTING PHYSICIAN:  Katie Lake MD    I came and saw her yesterday.  The last few days, she has been a little sleepier.  I looked at the pictures from CAT scans from a year and a half ago and the last few months.  She has definite significant periventricular dilatation and also has hydrocephalus.  Her brain is pushed against the skull.  I discussed the findings and showed the pictures to the  and the nurse.  I recommend getting a ventriculoperitoneal shunt done.  I discussed how it is done, including risks of bleeding, infection, obstruction.  They want me to proceed with the surgery.  The consent was signed.  Keep in ICU until that is done.  Will also arrange it for Friday morning or sooner if needed.        ______________________________  Katie Lake MD    IM/UB  DD:  08/07/2019  Time:  11:13AM  DT:  08/07/2019  Time:  11:24AM  Job #:  [id number]

## 2022-09-13 NOTE — ED PROVIDER NOTES
Patient:   Cathleen Shaikh             MRN: [id number]            FIN: [id number]               Age:   67 years     Sex:  Female     :  1950   Associated Diagnoses:   None   Author:   Yefri HEWITT MD, Estrada VELA      Addendum      Teaching-Supervisory Addendum-Brief   I participated in the following activities of this patients care: the medical history, the physical exam, medical decision making.   I personally performed: the medical history, the physical exam, the medical decision making.   The case was discussed with: the nurse practitioner.   Evaluation and management service: I agree with the evaluation and management decisions made in this patient's care.   Time seen: 2017 .   Results interpretation: I agree with the study interpretation in this patient's care, Nurse's notes reviewed.   Vital signs: Basic Oxygen Information   2017 14:01 CST     SpO2                      96 %                             Oxygen Therapy            Room air    2017 12:36 CST     SpO2                      99 %                             Oxygen Therapy            Room air  .   Notes: Patient seen and evaluated by the nurse practitioner I've also seen and evaluated and perform a own independent history and physical, this is a 67-year-old female status post slip fall struck the back of her head she is on chemotherapy.  Not on any blood thinners on exam patient with a 3 x 3 cm hematoma neurologically intact patient will be discharged after discussing the questionable CT findings Dr. Ramos.

## 2022-09-13 NOTE — ED PROVIDER NOTES
Patient:   Cathleen Shaikh            MRN: [id number]            FIN: [id number]               Age:   68 years     Sex:  Female     :  1950   Associated Diagnoses:   Altered mental status; Breast cancer; Brain metastases; Diabetes; General weakness   Author:   Presley FORDE, Luis CONDE      Basic Information   Time seen: Date & time 2019 19:04:00.   History source: Patient.   Arrival mode: Private vehicle.   History limitation: None.   Additional information: Patient's physician(s): Vamsi FORDE, Aleksandra Schmitz MD, Navin Wilson MD, Joyce MCCLELLAN   Provider/Visit info:    No qualifying data available.   .   History of Present Illness   The patient presents with 69y/o F presents to the ED with AMS after falling on yesterday. Hx of breast CA with brain metastasis. Daughter denies any fever body aches or chills. . Family concern she may be dehydrated. Kiara MARTINEZP-C and     I, Dr. Sherwood, assumed care of this patient at .    67 y/o C female with hx of DM, HTN, brain mets, and breast CA presents to ED with  at bedside following multiple falls today. Sx include weakness, disoriented, speech problems, and nonambulatory due to weakness. Pt denies weakness to UE/LE, SOB, and vomiting.  states the patient is scheduled for brain surgery with Dr. Lake  on Monday and he is concerned with the patient being home and would like to have her admitted until her surgery. Pt is currently on steroids after a fall last week where she was seen at [facility].   .  The onset was 2  days ago.  The character of symptoms is disoriented.  The degree at onset was moderate.  The degree at present is moderate.  Baseline status: alert and oriented X 4.   The exacerbating factor is none.  The relieving factor is none.  Risk factors consist of diabetes mellitus, hypertension and CA.  Prior episodes: none.  Therapy today: none.  Associated symptoms: weakness.  Additional history: none.        Review of Systems    Constitutional symptoms:  Negative except as documented in HPI   Skin symptoms:  Negative except as documented in HPI   Eye symptoms:  Negative except as documented in HPI   ENMT symptoms:  Negative except as documented in HPI   Respiratory symptoms:  denies SOB   Cardiovascular symptoms:  Negative except as documented in HPI   Gastrointestinal symptoms:  denies vomiting   Genitourinary symptoms:  Negative except as documented in HPI   Musculoskeletal symptoms:  Negative except as documented in HPI.   Neurologic symptoms:  weakness, denies weakness to UE and LE, AMS, speech problems      Health Status   Allergies:    Allergic Reactions (Selected)  No Known Allergies.   Medications:  (Selected)   Inpatient Medications  Future  Heparin Flush 100 U/mL - 5 mL: 500 units, form: Injection, IV Push, Once-chemo, *Est. first dose 07/30/19 12:26:00 CDT, *Est. stop date 07/30/19 12:26:00 CDT, Routine, Months 1, Future Order  Heparin Flush 100 U/mL - 5 mL: 500 units, form: Injection, IV Push, Once-chemo, *Est. first dose 10/22/19 16:47:00 CDT, *Est. stop date 10/22/19 16:47:00 CDT, Routine, Months 4, Future Order  heparin flush 100 units/mL (500 Unit  Flush): 500 units, form: Injection, IV Push, Once-chemo, *Est. first dose 01/07/19 8:00:00 CST, *Est. stop date 01/07/19 8:00:00 CST, Routine, Heparin Flush for Medi-port, Weeks 17, Future Order  heparin flush 100 units/mL (500 Unit  Flush): 500 units, form: Injection, IV Push, Once-chemo, *Est. first dose 03/04/19 8:00:00 CST, *Est. stop date 03/04/19 8:00:00 CST, Routine, Heparin Flush for Medi-port, Weeks 25, Future Order  heparin flush 100 units/mL (500 Unit  Flush): 500 units, form: Injection, IV Push, Once-chemo, *Est. first dose 09/17/18 8:00:00 CDT, *Est. stop date 09/17/18 8:00:00 CDT, Routine, Heparin Flush for Medi-port, Weeks 1, Future Order  heparin flush 100 units/mL (500 Unit  Flush): 500 units, form: Injection, IV Push, Once-chemo, *Est. first dose 11/12/18  8:00:00 CST, *Est. stop date 11/12/18 8:00:00 CST, Routine, Heparin Flush for Medi-port, Weeks 9, Future Order  Prescriptions  Prescribed  Aromasin 25 mg oral tablet: 25 mg = 1 tab(s), Oral, Daily, # 30 tab(s), 6 Refill(s), [facility]acy: [facility] [place]  Coreg 6.25 mg oral tablet: 6.25 mg = 1 tab(s), Oral, BID, # 60 tab(s), 11 Refill(s), Pharmacy: [facility]  Lantus Solostar Pen 100 units/mL subcutaneous solution: See Instructions, inject 60 units subcutaneously twice daily, # 45 mL, eRx: [facility] [place]  Lasix 40 mg oral tablet: 40 mg, Oral, Daily, PRN PRN swelling, # 30 tab(s), 11 Refill(s), Pharmacy: [facility] [place]  Neurontin 600 mg oral tablet: 600 mg = 1 tab(s), Oral, TID, # 90 tab(s), 11 Refill(s), Pharmacy: [facility] [place]  Swisher 10 mg-325 mg oral tablet: 1 tab(s), Oral, q4hr, PRN PRN as needed for pain, # 30 tab(s), 0 Refill(s), Pharmacy: [facility] [place]  One Touch Verio Test Strips: One Touch Verio Test Strips, See Instructions, Use twice daily to check CBG  Dx: E11.9, # 200 EA, 0 Refill(s), Pharmacy: [facility] [place]  Restoril 30 mg oral capsule: 30 mg = 1 cap(s), Oral, Once a day (at bedtime), PRN PRN for sleep, # 30 cap(s), 0 Refill(s), Pharmacy: [facility] [place]  hydrOXYzine pamoate 25 mg oral capsule: See Instructions, TAKE ONE CAPSULE BY MOUTH FOUR TIMES DAILY AS NEEDED FOR ANXIETY, # 30 cap(s), 11 Refill(s), eRx: [facility] [place]  hydrochlorothiazide 25 mg oral tablet: See Instructions, TAKE ONE TABLET BY MOUTH DAILY, # 30 tab(s), 11 Refill(s), Pharmacy: [facility]  ibuprofen 600 mg oral tablet: 600 mg = 1 tab(s), Oral, q8hr, PRN PRN as needed for pain, # 90 tab(s), 0 Refill(s), Pharm[facility]: [facility] [place]  Documented Medications  Documented  Centrum Silver oral tablet: 1 tab(s), Oral, Daily, # 30 tab(s), 0  Refill(s)  Decadron 4 mg oral tablet: 4 mg = 1 tab(s), Oral, BID  Vitamin B Complex oral capsule: 1 cap(s), Oral, Daily, 0 Refill(s)  calcium carbonate 600 mg oral tablet: 600 mg = 1 tab(s), Oral, BID, # 60 tab(s), 0 Refill(s)  carvedilol 12.5 mg oral tablet: 12.5 mg = 1 tab(s), Oral, BID  dexamethasone 0.5 mg oral tablet: See Instructions  lisinopril 20 mg oral tablet: 20 mg = 1 tab(s), Oral, Daily  metFORMIN 1000 mg oral tablet: 1,000 mg = 1 tab(s), Oral, BID, # 60 tab(s), 0 Refill(s)  ondansetron 8 mg oral tablet: 8 mg = 1 tab(s), Oral, TID.      Past Medical/ Family/ Social History   Medical history:    Resolved  Breast cancer ([id number]):  Resolved.  Encounter for weight loss counseling (008660024):  Resolved.  Left foot pain ([id number]):  Resolved.  Tachycardia (8619695):  Resolved.  Dyspnea (850992640):  Resolved.  Low TSH level (178334711):  Resolved..   Surgical history:    Knee replacement ([id number]).  Comments:  10/24/2017 10:11 Shae Martinez LPN  Bilateral knee replacements.  Hysterectomy ([id number]).  Comments:  10/24/2017 10:12 Shae Martinez LPN  Partial hysterectomy.   Family history:    Stent  Sister  Primary malignant neoplasm of lung  Mother  Brother  Leukemia  Father  Acute myocardial infarction.  Father  Hypertension.  Sister  Diabetes mellitus type 1.  Sister  COPD (chronic obstructive pulmonary disease).  Sister  CABG (Coronary artery bypass grafting) planned  Father  Brother  Sister  .   Social history:    Social & Psychosocial Habits    Alcohol    Comment: miguel - 12/25/2017 12:Princess Davila RN    Employment/School  04/24/2018  Status: Retired    Home/Environment  04/24/2018  Lives with: Spouse    Nutrition/Health  04/24/2018  Type of diet: Regular    Sexual  04/24/2018  Sexually active: Yes    Substance Use    Comment: miguel - 12/25/2017 12:39 Princess Regalado RN    Tobacco  07/05/2019  Use: Never (less than 100 in l    Patient Wants Consult For Cessation  Counseling N/A    Abuse/Neglect  07/05/2019  SHX Any signs of abuse or neglect No    .   Problem list:    Active Problems (8)  Brain metastasis   Diabetes mellitus, type II   Diabetic neuropathy   Fatigue   HTN (hypertension)   Malignant neoplasm of upper-outer quadrant of right breast in female, estrogen receptor positive   Morbid obesity   Postmenopausal state     .      Physical Examination               Vital Signs   Vital Signs   8/1/2019 19:02 CDT       Temperature Oral          37 DegC                             Temperature Oral (calculated)             98.60 DegF                             Peripheral Pulse Rate     99 bpm                             SpO2                      96 %                             Oxygen Therapy            Room air                             Systolic Blood Pressure   121 mmHg                             Diastolic Blood Pressure  86 mmHg  .   Measurements   8/1/2019 19:02 CDT       Weight Dosing             124 kg                             Weight Measured and Calculated in Lbs     273.37 lb                             Weight Estimated          124 kg  .   Basic Oxygen Information   8/1/2019 19:02 CDT       SpO2                      96 %                             Oxygen Therapy            Room air    General:  Alert, no acute distress, obese   Neurological:  Alert and oriented to person, place, time, and situation, normal speech observed, Cranial nerves II - XII: mild right facial droop.    Skin:  Warm, dry   Head:  Normocephalic, atraumatic   Eye:     Cardiovascular:  Regular rate and rhythm, Normal peripheral perfusion, No edema   Respiratory:  Lungs are clear to auscultation, respirations are non-labored, breath sounds are equal, Symmetrical chest wall expansion.    Musculoskeletal:  No deformity   Gastrointestinal:  Soft, Nontender, Non distended, Normal bowel sounds, Guarding: Negative, Rebound: Negative.    Psychiatric:  Cooperative, poverty of speech      Medical  Decision Making   Rationale:  Generalized weakness has been progressing as well as confusion also progressing over last few weeks.  Known history of breast cancer with brain metastases plan for operative intervention of the brain mass on Monday according to the family.  She is on dexamethasone has had a few falls not complaining of any plan or anything right now family states she is just too weak unable to ambulate on her own and they're unable to care for her at this time.   Documents reviewed:  Emergency department nurses' notes.   Orders  Launch Order Profile (Selected)   Inpatient Orders  Ordered  EK19 19:07:00 CDT, Stat, Once, 19 19:07:00 CDT, -1, -1, 19 19:07:00 CDT  Ordered (Dispatched)  UA Total a reflex to culture: Stat collect, Urine, 19 19:07:00 CDT, Stop date 19 19:07:00 CDT, Nurse collect, Print Label By Order Location  Completed  Automated Diff: NOW collect, 19 19:13:00 CDT, Blood, Collected, Stop date 19 19:13:00 CDT, Lab Collect, Print Label By Order Location, 19 19:06:00 CDT  CBC w/ Auto Diff: NOW collect, 19 19:13:43 CDT, BLOOD, Collected, Stop date 19 19:13:00 CDT, Lab Collect  CMP: STAT collect, 19 19:13:43 CDT, BLOOD, Collected, Stop date 19 19:13:00 CDT, Lab Collect  CT Head W/O Contrast: Stat, 19 19:07:00 CDT, Altered level of Consciousness, recent fall. Hx of brain metastasis, None, Stretcher, Rad Type, Schedule this test, 19 19:07:00 CDT  Estimated Glomerular Filtration Rate: STAT collect, 19 19:13:00 CDT, Blood, Collected, Stop date 19 19:13:00 CDT, Lab Collect, Print Label By Order Location, 19 19:07:00 CDT  Normal Saline (0.9% NS) IV 1,000 mL: 1,000 mL, 1,000 mL, IV, 999 mL/hr, order duration: 1 dose(s), start date 19 19:07:00 CDT, stop date 19 20:06:00 CDT, 2.3, m2  Troponin-I: STAT collect, 19 19:13:43 CDT, BLOOD, Collected, Stop date 19 19:13:00 CDT, Lab  Collect  .   Electrocardiogram:  Time 8/1/2019 20:27:00, rate 83, normal sinus rhythm, No ST-T changes, normal MA & QRS intervals, EP Interp, Ectopy Occasional, premature ventricular contractions.    Results review:  Lab results : Lab View   8/1/2019 19:13 CDT       Sodium Lvl                138 mmol/L                             Potassium Lvl             3.9 mmol/L                             Chloride                  100 mmol/L                             CO2                       28.0 mmol/L                             Calcium Lvl               9.7 mg/dL                             Glucose Lvl               217 mg/dL  HI                             BUN                       35.0 mg/dL  HI                             Creatinine                1.02 mg/dL                             eGFR-AA                   >60 mL/min/1.73 m2  NA                             eGFR-ALLIE                  58 mL/min/1.73 m2  NA                             Bili Total                0.4 mg/dL                             Bili Direct               0.10 mg/dL                             Bili Indirect             0.30 mg/dL                             AST                       11 unit/L  LOW                             ALT                       32 unit/L                             Alk Phos                  128 unit/L  HI                             Total Protein             7.1 gm/dL                             Albumin Lvl               3.50 gm/dL                             Globulin                  3.60 gm/dL  HI                             A/G Ratio                 1.0 ratio  LOW                             Troponin-I                <0.02 ng/mL                             WBC                       16.3 x10(3)/mcL  HI                             RBC                       4.34 x10(6)/mcL                             Hgb                       14.1 gm/dL                             Hct                       42.8 %                              Platelet                  245 x10(3)/mcL                             MCV                       98.6 fL  HI                             MCH                       32.5 pg  HI                             MCHC                      32.9 gm/dL  LOW                             RDW                       13.1 %                             MPV                       11.0 fL                             Abs Neut                  13.23 x10(3)/mcL  HI                             Neutro Auto               81 %  NA                             Lymph Auto                12 %  NA                             Mono Auto                 6 %  NA                             Basophil Auto             0 %  NA                             Abs Neutro                13.23 x10(3)/mcL  HI                             Abs Lymph                 2.0 x10(3)/mcL                             Abs Mono                  1.0 x10(3)/mcL                             Abs Baso                  0.0 x10(3)/mcL    8/1/2019 12:20 CDT       POC Creat                 1.0 mg/dL    8/1/2019 11:36 CDT       Sodium Lvl                140 mmol/L                             Potassium Lvl             4.0 mmol/L                             Chloride                  103 mmol/L                             CO2                       27.0 mmol/L                             Calcium Lvl               9.9 mg/dL                             Glucose Lvl               235 mg/dL  HI                             BUN                       33.0 mg/dL  HI                             Creatinine                1.02 mg/dL                             BUN/Creat Ratio           32.4  NA                             eGFR-AA                   >60 mL/min/1.73 m2  NA                             eGFR-ALLIE                  58 mL/min/1.73 m2  NA                             PT                        14.1 second(s)  HI                             INR                       1.1                             PTT                        24.0 second(s)  LOW                             Hgb                       14.4 gm/dL                             Hct                       44.4 %                             Platelet                  233 x10(3)/mcL    .   Radiology results:  Rad Results (ST)  < 12 hrs   Accession: [id number]  Order: CT Head W/O Contrast  Report Dt/Tm: 08/01/2019 20:11  Report:   CT Head     Indication: Recent fall, history of brain metastasis     Comparison: CT head 12/25/2017     Technique: Axial CT images were obtained through the head without  contrast.  Coronal and sagittal reconstructions submitted and  interpreted.   Total . Automated exposure control utilized.     Findings:     No hemorrhage, mass effect or CT evidence of acute cortical  infarction. Periventricular white matter hypodensities may relate to  small vessel ischemic disease. Ventricles are slightly enlarged out of  proportion with the sulci.      No abnormal extra-axial fluid collections.     No hyperdense artery or vein.     No skull fracture. Visualized paranasal sinuses and mastoid air cells  are clear.     IMPRESSION:     No skull fracture or acute intracranial findings        .      Impression and Plan   Diagnosis   Altered mental status (ED [id number])   Breast cancer (UYC27-AO C50.919)   Brain metastases (MLV10-YA C79.31)   Diabetes (XRW17-VN E11.9)   General weakness (NKM02-LY R53.1)      Calls-Consults   -  Eugenia Mora.   Plan   Condition: Improved, Stable.    Disposition: Admit.    Counseled: Patient, Family, Regarding diagnosis, Regarding diagnostic results, Regarding treatment plan, Patient indicated understanding of instructions, Family verbalizes understanding. .    Notes: I, Jackelyn Iglesias, acted solely as a scribe for and in the presence of Dr. Sherwood who performed the service.   , I, Dr Sherwood have read note from scribe and I agree with history and physical except as amended by me.   All information was dictated from my history and my examination of patient.,  Return to the ER for fever over 100.4, if you are unable to stop vomiting, if you have severe abdominal pain, or if your symptoms change or worsen in any way

## 2022-09-13 NOTE — H&P
Patient:   Cathleen Shaikh            MRN: [id number]            FIN: [id number]               Age:   68 years     Sex:  Female     :  1950   Associated Diagnoses:   None   Author:   Monserrat Davila      Date of Service: 2019       Chief Complaint   Breast cancer with brain metastasis s/p posterior fossa craniectomy with resection of cerebellar lesion 2019 and  shunt placement on 2019      History of Present Illness   68-year-old WF presented to [facility] ED with lethargy, generalized weakness and multiple falls.  PMH significant for right breast infiltrating ductal adenocarcinoma stage IIIa diagnosed 2016 s/p neoadjuvant AC/toxol and lumpectomy with axillary node dissection followed by adjuvant radiotherapy.  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  Decadron initiated.  Tolerated CyberKnife treatment on 3/21/2019.  ED work up significant for UTI and leukocytosis (likely steroid-induced).  Rocephin initiated.  CT head unremarkable.  Tolerated posterior fossa craniectomy with resection of cerebellar lesion/tumor using microdissection on 2019.  Increased lethargy on  and repeat CT head with no changes.  Tolerated  shunt placement 2/2 chronic hydrocephalus with ventricular dilatation on .  Mental status labile throughout inpatient course.  Repeat head CT significant for increased visibility of gyri/sulci on  indicative of functioning  shunt per neurosurgery.  Rocephin discontinued and doxycycline initiated on .  Tolerated transfer to [facility] SNF unit on 8/15 without incident.       Today, resting in bed in no acute distress.  Denies any current complaints.  Lethargic, but easily aroused and answers some questions appropriately.  Reports good sleep pattern.  Reports good appetite.  Unknown last BM.  BP mildly elevated.  CBGs at goal.  Recent lab work and imaging reported below.         Histories   Past Medical History: Breast cancer diagnosed in 2016, Brain metastasis diagnosed in 2018, DM type II, Diabetic neuropathy, HTN, Fatigue, Morbid obesity, Dyspnea, Left foot pain, Tachycardia, Low TSH level, Hydrocephalus   Family History: Father- in 60s, PMH of leukemia+ MI+ CABG, mother- in 60s, PMH of lung cancer, sister-PMH of DM type II+ CABG+HTN+ COPD, brother-PMH of lung cancer+ CABG   Procedure history: Posterior fossa craniectomy, resection of cerebellar lesion/tumor using microdissection on 2019, Right occipital bur hole, placement of ventricular drain on 2019, Hysterectomy, Knee replacement, Cervical fusion, Lymphadenectomy/lumpectomy    Social History     (-) TOB.     (-) EtOH.     (-) illicit drug use.     Disabled.   to Rowdy Shaikh.  Two adult children (Rowdy and Jennifer).        Review of Systems   Complete 12-point review of systems negative except for HPI      Health Status   Allergies:    Allergic Reactions (Selected)  No Known Allergies   Current medications:  (Selected)   Documented Medications  Documented  Centrum Silver oral tablet: 1 tab(s), Oral, Daily, # 30 tab(s), 0 Refill(s)  calcium carbonate 600 mg oral tablet: 600 mg = 1 tab(s), Oral, BID, # 60 tab(s), 0 Refill(s)  carvedilol 12.5 mg oral tablet: 12.5 mg = 1 tab(s), Oral, BID  lisinopril 20 mg oral tablet: 20 mg = 1 tab(s), Oral, Daily  metFORMIN 1000 mg oral tablet: 1,000 mg = 1 tab(s), Oral, BID, # 60 tab(s), 0 Refill(s)  temazepam 30 mg oral capsule: 30 mg = 1 cap(s), Oral, Once a day (at bedtime), PRN PRN for sleep, 0 Refill(s)      Physical Examination      Vital Signs (last 24 hrs)_____  Last Charted___________  Temp Oral      37.0 DegC  (AUG 16 04:)  Heart Rate Peripheral   88 bpm  (AUG 16 04:00)  SBP      H 145mmHg  (AUG 16 04:00)  DBP      63 mmHg  (AUG 16 04:00)  SpO2      95 %  (AUG 16 04:00)  Weight      120.1 kg  (AUG 16 05:00)     General:  No acute distress, lethargic.    Eye:  Pupils  are equal, round and reactive to light, Normal conjunctiva.    HENT:  Normocephalic, Steri strips to posterior head, clean/dry dressing to right parietal region.    Neck:  Supple, No jugular venous distention.    Respiratory:  Lungs are clear to auscultation, Respirations are non-labored, Breath sounds are equal, Symmetrical chest wall expansion.    Cardiovascular:  Normal rate, Regular rhythm, No murmur, Good pulses equal in all extremities, Normal peripheral perfusion.    Gastrointestinal:  Soft, Non-tender, Non-distended, Normal bowel sounds, obese, steri strips to navel and right abdomen.    Musculoskeletal:  generalized weakness, follows commands, purposeful movement of extremities.    Integumentary:  Warm, Dry.    Neurologic:  Alert, oriented to self, city (not to year or president).    Cognition and Speech:  slow to respond, but answers some questions appropriately.    Psychiatric:  Cooperative.       Review / Management   Results review:     No qualifying data available.    Laboratory Results   Today's Lab Results : PowerNote Discrete Results   8/16/2019 8:33 CDT       WBC                       8.9 x10(3)/mcL                             RBC                       4.21 x10(6)/mcL                             Hgb                       14.0 gm/dL                             Hct                       41.5 %                             Platelet                  149 x10(3)/mcL                             MCV                       98.6 fL  HI                             MCH                       33.3 pg  HI                             MCHC                      33.7 gm/dL                             RDW                       13.4 %                             MPV                       11.0 fL  HI                             Abs Neut                  6.51 x10(3)/mcL                             Neutro Auto               73.0 %                             Lymph Auto                15.3 %  LOW                              Mono Auto                 7.0 %                             Eos Auto                  2.2 %                             Abs Eos                   0.20 x10(3)/mcL                             Basophil Auto             0.2 %                             Abs Neutro                6.51 x10(3)/mcL                             Abs Lymph                 1.37 x10(3)/mcL                             Abs Mono                  0.63 x10(3)/mcL                             Abs Baso                  0.02 x10(3)/mcL                             NRBC%                     0.0 %                             IG%                       2.300 %  HI                             IG#                       0.2100  HI                             Sodium Lvl                143 mmol/L                             Potassium Lvl             3.8 mmol/L                             Chloride                  107 mmol/L                             CO2                       30.0 mmol/L                             Calcium Lvl               9.3 mg/dL                             Glucose Lvl               112 mg/dL  HI                             BUN                       16.0 mg/dL                             Creatinine                0.59 mg/dL                             eGFR-AA                   >60 mL/min/1.73 m2  NA                             eGFR-ALLIE                  >60 mL/min/1.73 m2  NA                             Bili Total                0.4 mg/dL                             Bili Direct               0.12 mg/dL                             Bili Indirect             0.28 mg/dL                             AST                       27 unit/L                             ALT                       49 unit/L                             Alk Phos                  70 unit/L                             Total Protein             6.6 gm/dL                             Albumin Lvl               3.1 gm/dL  LOW                             Globulin                  4  gm/dL                             A/G Ratio                 0.8  NA                             Prealbumin                34.4 mg/dL        Radiology results   CT, Without contrast, Head , Reported at  8/1/2019 20:09:00, Interpretation:  No skull fracture or acute intracranial findings   Radiology results   CT, Without contrast, Head , Reported at  8/12/2019 08:26:00, Interpretation:  Increased pneumocephalus along the ventriculostomy catheter is of unclear significance. No other significant change.   Radiology results   X-ray, KUB , Reported at  8/2/2019 14:45:00, Interpretation:  Nonobstructed bowel gas pattern.      Impression and Plan   69 yo WF admitted on 8/15/2019    Breast cancer with brain metastasis   -  shunt placement on 8/9/2019  - s/p posterior fossa craniectomy with resection of cerebellar lesion 8/5/2019   - s/p chemo/radiation in 2016 and 2019 (CyberKnife treatment 03/2019)  - s/p lymphadenectomy 2018  - continue     Aromasin 25 mg daily    Dexamethasone 1mg twice daily (wean per neurosurgery)  - closely monitor for changes in mental status   - wound care to evaluate and treat   - neurosurgery following     Debility  - 2/2 Breast cancer with brain metastasis s/p posterior fossa craniectomy with resection of cerebellar lesion 8/5/2019 and  shunt placement on 8/9/2019  - PT/OT/ST to evaluate and treat  - OOB with all meals  - blinds open during the day   - reorient as needed    Morbid obesity  - monitor weight  - dietary to follow   -  on weight loss/low fat diet when appropriate  - encourage participation in therapy/physical exercise     DM type II  - HgA1c 8.0 on 8/2  - continue    Metformin 1000 mg twice daily    Lantus 10 units at bedtime    ISS   - CBGs AC & HS    HTN  - mildly elevated   -continue    Coreg 12.5 mg twice daily    Lisinopril 20 mg daily    Hydralazine 25 mg 3 times daily    Hydralazine 10 mg every 2 hours as needed for BP > 160/90    Labetalol 10 mg IV push every 2  hours as needed  - Low-sodium diet     Dysphagia  - mechanically altered diet   - monitor closely     Diabetic neuropathy   - stable  - continue    Neurontin 600 mg 3 times daily    Constipation  - stable  - continue    Colace 100 mg twice daily    UTI  - no dysuria or fever  - continue   Doxycycline 100 mg twice daily 8/14-present (end date 8/18)    AB therapy  Doxycycline 100 mg 8/14-present (end date 8/18)  Rocephin 8/2-8/5 and 8/11-8/14  Cefazolin 8/5 and 8/9    VTE Prophylaxis: Heparin 5000 units twice daily    POA: Yes, Rowdy Shaikh () [phone]  Living will: No  Contacts:  Rowdy Shaikh ()  [phone]    CODE STATUS: FULL CODE  Internal Medicine: Silvestre Wren MD    OUTPATIENT PROVIDERS  PCP:  Deep Agustin MD  Neurosurgery:  Katie Lake MD  Radiation oncology:  Wayne Kumar MD  Medical oncology:   MD Joyce Palma MD    DISPOSITION: Condition stable.  Tolerated transfer.  Recent labs and imaging reviewing.  SNF admission orders initiated.  Med reconciliation completed.  PT/OT/ST to evaluate and treat.  Will monitor closely.  Notify MD of acute changes.      Total time spent on this encounter including chart review and direct 1-on-1 patient interaction: 106 minutes   Over 50% of this time was spent in counseling and coordination of care

## 2022-09-13 NOTE — HISTORICAL [facility] CERNER
This is a historical note converted from [place]. Formatting and pictures may have been removed.  Please reference [place] for original formatting and attached multimedia. Admit and Discharge Dates  Admit Date: 09/09/2019  Discharge Date: 09/15/2019  Physicians  Attending Physician - Priscilla FORDE, Patricia  Attending Physician - Gennaro FORDE, Erasto VELA  Attending Physician - Indra FORDE, Sharan  Admitting Physician - Gennaro FORDE, Erasto VELA  Consulting Physician - Kayode FORDE, Ned  Primary Care Physician - Navin FORDE, Joyce REILLY  Primary Care Physician - Vamsi FORDE, Deep CORTES  Primary Care Physician - Vamsi FORDE, Deep CORTES  Primary Care Physician Steve Agustin MD, Deep CORTES  Primary Care Physician - Holger FORDE, Silvestre Weiss  Discharge Diagnosis  1.?Acute encephalopathy?G93.40  2.?Brain metastasis?C79.31  3.?Breast cancer, stage 4?C50.919  4.?Bacteriuria?R82.71  5.?Hydrocephalus in adult?G91.9  6.?HTN (hypertension)?I10  7.?Uncontrolled type 2 diabetes mellitus with hyperglycemia?E11.65  Acute UTI?N39.0  Altered mental status?[id number]  Status post craniotomy?Z98.890  Surgical Procedures  No procedures recorded for this visit.  Immunizations  No immunizations recorded for this visit.  Hospital Course  Ms. Shaikh is a 68-year-old female with a history of right breast adenocarcinoma diagnosed in 2016, with subsequent development of brain metastases found in March 2019, status post CyberKnife treatment, followed by surgical resection 8/5/19 and placement of  shunt for hydrocephalus 8/9/19. ?She was discharged from that hospitalization to physical rehab where she has been since, and  at bedside states that her progression has been minimal due to her waxing and waning mental status. ?Review the documentation shows that the patient was discharged from the rehab facility today, directed to the ER for further evaluation due to worsening altered mental status. ?Her  assists greatly at bedside and  states that she often is worse at night, and her mental status waxes and wanes, but basically her physical therapy progression is severely limited by the fact that her cognition has been unstable and overall declining. ?On presentation to the ER she was confused, but awake and alert in no acute distress. ?She underwent a CT of her head on arrival which showed no acute intracranial hemorrhage, postsurgical changes of occipital craniotomy, and an increasing hypodensity within the surgical bed which may be related to edema and continued gliosis or evolution. ?Remainder of her workup was unremarkable/stable. ?Hospitalist service was asked to admit the patient with neurosurgical consultation. ? states the patient has had some episodes of emesis over the last few days, but no reported fever or chills. ?He also states that he understands that this may be part of a progressive decline in the setting of metastatic cancer, but would just like to make sure that no other medical cause could be making her mental status not improve for such time. ?Patient had leukocytosis so urine cultures were taken showing E. coli less than?10,000 colonies. ?She was initiated on IV Rocephin. She underwent repeat UA reflex to culture which came back negative so far.?Patient continues to decline, neurology was consulted?and?heme-onc as well. ?They are in agreement with poor prognosis recommends hospice. ?Had discussion with the family?they are willing to?send?inpatient?hospice. ?She will be discharged to [facility] today.? Appreciate?consults of?oncology and neurology.  Time Spent on discharge  > 31mins  Objective  Vitals & Measurements  T:?37.6? ?C (Oral)? TMIN:?36.8? ?C (Oral)? TMAX:?37.6? ?C (Oral)? HR:?66(Peripheral)? RR:?18? BP:?134/83? SpO2:?95%?  Physical Exam  GENERAL: awake and in no acute distress  LUNGS: CTA anteriorly  CVS: Normal rate  ABD: Soft, non-tender  EXTREMITIES: peripheral pulses 2+  NEURO: Awake and  alert  PSYCHIATRIC: Calm and cooperative  Patient Discharge Condition  Guarded  Discharge Disposition  [facility]   Discharge Medication Reconciliation  Prescribed  dexamethasone (Decadron)?4 mg, Oral, BID  insulin glargine (insulin glargine 100 units/mL subcutaneous solution)?30 units, Subcutaneous, At Bedtime  miconazole topical (Zeasorb-AF)?1 iam, TOP, TID  Continue  aldioxa-chloroxylenol topical (ZeaSORB)?1 iam, TOP, BID  calcium carbonate (calcium carbonate 500 mg oral tablet, chewable)?500 mg, Oral, BID  carvedilol (carvedilol 25 mg oral tablet)?25 mg, Oral, BID  docusate (Colace 100 mg oral capsule)?100 mg, Oral, BID  gabapentin (Neurontin 300 mg oral capsule)?300 mg, Oral, TID  hydrALAZINE (hydrALAZINE 25 mg oral tablet)?25 mg, Oral, TID  levETIRAcetam (Keppra 500 mg oral tablet)?500 mg, Oral, BID  lisinopril (lisinopril 20 mg oral tablet)?40 mg, Oral, At Bedtime  metFORMIN (metFORMIN 1000 mg oral tablet), Oral, BID  zinc oxide topical (Balmex topical cream)?apply topically, TOP, BID  Discontinue  Template Non-Formulary Med  insulin glargine (Lantus 100 U/mL (per 1 unit))?15 units, Subcutaneous, Once a day (at bedtime)  insulin regular?2-10 units based on sliding scale, Subcutaneous, As Directed, PRN blood glucose  multivitamin with minerals (Multiple Vitamins with Minerals oral capsule)?1 tabs(s), Oral, Daily  nitrofurantoin (Macrobid 100 mg oral capsule)?100 mg, Oral, BID  pioglitazone (Actos 15 mg oral tablet)?15 mg, Oral, Daily  Education and Orders Provided  Metastatic Brain Tumor, Adult  Discharge - 09/15/19 8:51:00 CDT, [facility]?  Follow up  Not required

## 2022-09-13 NOTE — OP NOTE
DATE OF SURGERY:    08/09/2019    SURGEON:  Katie Lake MD  COSURGEON:  Aaron Clifford MD    PREOPERATIVE DIAGNOSIS:  Hydrocephalus, status post surgery/normal pressure/idiopathic/acquired hydrocephalus.    POSTOPERATIVE DIAGNOSIS:  Hydrocephalus, status post surgery/normal pressure/idiopathic/acquired hydrocephalus.    PROCEDURE:  Right occipital bur hole, placement of ventricular drain, Codman programmable shunt set at 110, Stealth system was used.    COMPLICATIONS:  None.    INDICATIONS FOR PROCEDURE:  The patient is a 68-year-old lady with surgery 5 days ago, who was also noted to have a significantly dilated ventricle compared to pictures from before to now.  I notified the family, who understood.  I wanted to do a shunt because of her waxing and waning level of consciousness and confusion.  After a long discussion with the family of how the surgery and why the surgery, the consent form was signed.  They understand and wanted me to proceed with surgery.  I went over that in detail.    PROCEDURE IN DETAIL:  The patient was brought in the operating room and intubated.  The occipital head, chest, and neck area were sterilely prepped and draped.  We registered the head part and subsequently the occipital area.  We tunneled the shunt from head/neck to the abdomen, and we put a hole there.  We put a ventricular catheter into the ventricle.  It was a Codman programmable shunt set at 110.  We put the ventricle right over the Stealth system.  There was good CSF under pressure.  This catheter was cut to 10, attached to the reservoir, and tighten.  The wound was irrigated.  We made sure it remained tight.  Then the abdominal part of the catheter was then was done by Dr. Estrella, and it was done to the belly.  So, it was a Codman programmable shunt set at 110 with no complications.      ______________________________  MD ISABEL Wu/ARMANI  DD:  08/09/2019  Time:  08:09AM  DT:  08/09/2019  Time:   08:24AM  Job #:  [id number]

## 2022-09-13 NOTE — HISTORICAL [facility] CERNER
This is a historical note converted from [place]. Formatting and pictures may have been removed.  Please reference [place] for original formatting and attached multimedia. Chief Complaint  Patient sent from Ortho ext care from [facility] since brain tumor removal 8/5 worsening yesterday, sx by Dr Quick  History of Present Illness  Patient is 68-year-old female with a history of right breast adenocarcinoma diagnosed in 2016, with subsequent development of brain metastases found in March 2019, status post CyberKnife treatment, followed by surgical resection 8/5/19 and placement of  shunt for hydrocephalus 8/9/19.? She was discharged from that hospitalization to physical rehab where she has been since, and  at bedside states that her progression has been minimal due to her waxing and waning mental status.? Review the documentation shows that the patient was discharged from the rehab facility today, directed to the ER for further evaluation due to worsening altered mental status.? Her  assists greatly at bedside and states that she often is worse at night, and her mental status waxes and wanes, but basically her physical therapy progression is severely limited by the fact that her cognition has been unstable and overall declining.? On presentation to the ER she was confused, but awake and alert in no acute distress.? She underwent a CT of her head on arrival which showed no acute intracranial hemorrhage, postsurgical changes of occipital craniotomy, and an increasing hypodensity within the surgical bed which may be related to edema and continued gliosis or evolution.? Remainder of her workup was unremarkable/stable.? Hospitalist service was asked to admit the patient with neurosurgical consultation.?  states the patient has had some episodes of emesis over the last few days, but no reported fever or chills.? He also states that he understands that this may be part of a progressive decline in the setting  of metastatic cancer, but would just like to make sure that no other medical cause could be making her mental status not improve for such time.  Review of Systems  General_negative except as stated above  HEENT_negative except as stated above  Neck_negative except as stated above  Respiratory_negative except as stated above  Cardiovascular_negative except as stated above  Gastrointestinal_negative except as stated above  Genitourinary_negative except as stated above  Musculoskeletal_negative except as stated above  Immunologic_negative except as stated above  Neurologic_negative except as stated above  Psychiatric_negative except as stated above  Physical Exam  Vitals & Measurements  T:?36.8? ?C (Oral)? HR:?94(Peripheral)? RR:?22? BP:?158/106? SpO2:?95%? WT:?118?kg?  GENERAL: awake, alert, oriented to self?but not year or location, non-toxic  HEENT: normocephalic atraumatic?  NECK: supple?  LUNGS: CTA B/L, no rales or rhonchi, moving air well without resp distress  CVS: Regular rate and rhythm, normal peripheral perfusion  ABD: Soft, non-tender, non-distended, bowel sounds present  EXTREMITIES: no clubbing or cyanosis  SKIN: Warm, dry.?  NEURO: alert and oriented, globally weak, slurred speech  PSYCHIATRIC: Cooperative  ?  Assessment/Plan  Persistent?altered mental status and failure to thrive  Bacteriuria, unable to assess symptoms  Breast cancer with brain metastasis  Resection of cerebellar brain lesion,?8/5/2019  Chronic?hydrocephalus status post  shunt placement 8/9/2019  Left lower lobe?opacity likely atelectasis without clinical signs of pneumonia  Uncontrolled hypertension  Hyperglycemia in the setting of DM2  ?  - Monitor for?signs and symptoms of an infectious process as the etiology of her persistent encephalopathy although she is showing no systemic signs of infection.? Will however send urine for culture and initiate ceftriaxone for bacteriuria as she is prone to urinary tract infections.  - Monitor  for seizure activity and continue Keppra, add steroids  - Consultation placed to neurosurgery for evaluation of her  shunt?as well as findings on CT scan  - Spent?an extended period of time explaining to the patients  at bedside?that?the fact that she has not had significant?improvement throughout her time?of stay of the physical rehab center,?this may be representing?a continuous decline in the setting of her metastatic cancer.? Since she had not improved after her?craniotomy?I explained it is very likely she may not ever improve, and the  states he does realize that this could be the situation, however he wanted?to make sure there was no underlying medical reasons why she has not progressed.?  - Recommend continued/ongoing discussions regarding overall goals of care  ?  SCDs  Full code per  at bedside  PCP: FLOYD Agustin MD   Problem List/Past Medical History  Ongoing  Brain metastasis  Diabetes mellitus, type II  Diabetic neuropathy  Fatigue  Fluid volume deficit risk  HTN (hypertension)  Impaired skin integrity  Malignant neoplasm of upper-outer quadrant of right breast in female, estrogen receptor positive  Mood alteration  Morbid obesity  Pain management  Postmenopausal state  Historical  Breast cancer  Dyspnea  Encounter for weight loss counseling  Left foot pain  Low TSH level  Tachycardia  Procedure/Surgical History  Laparoscopic Shunt Ventriculo Peritoneal (.) (08/09/2019)  Shunt Ventriculoperitoneal with Stealth (.) (08/09/2019)  Craniotomy Posterior Fossa Decompression (.) (08/05/2019)  Excision of Cerebellum, Open Approach (08/05/2019)  Bypass Cerebral Ventricle to Peritoneal Cavity with Synthetic Substitute, Percutaneous Approach (08/04/2019)  Inspection of Peritoneal Cavity, Percutaneous Endoscopic Approach (08/04/2019)  carpel tunnel surgery  cervical fusion  Hysterectomy  Knee replacement  Mastectomy, partial (eg, lumpectomy, tylectomy, quadrantectomy, segmentectomy);  [facility]    Medications  Home  Actos 15 mg oral tablet, 15 mg= 1 tab(s), Oral, Daily  Balmex topical cream, apply topically, TOP, BID  calcium carbonate 500 mg oral tablet, chewable, 500 mg= 1 tab(s), Oral, BID  carvedilol 25 mg oral tablet, 25 mg= 1 tab(s), Oral, BID  Colace 100 mg oral capsule, 100 mg= 1 cap(s), Oral, BID  hydrALAZINE 25 mg oral tablet, 25 mg= 1 tab(s), Oral, TID  insulin regular, 2-10 units based on sliding scale, Subcutaneous, As Directed, PRN  Keppra 500 mg oral tablet, 500 mg= 1 tab(s), Oral, BID  Lantus 100 U/mL (per 1 unit), 15 units, Subcutaneous, Once a day (at bedtime)  lisinopril 20 mg oral tablet, 40 mg= 2 tab(s), Oral, At Bedtime  Macrobid 100 mg oral capsule, 100 mg= 1 cap(s), Oral, BID  metFORMIN 1000 mg oral tablet, Oral, BID  Multiple Vitamins with Minerals oral capsule, 1 tabs(s), Oral, Daily  Neurontin 300 mg oral capsule, 300 mg= 1 cap(s), Oral, TID  Template Non-Formulary Med  ZeaSORB, 1 iam, TOP, BID  Allergies  No Known Allergies  Social History  Abuse/Neglect  No, 08/01/2019  No, 07/05/2019  Alcohol  Employment/School  Retired, 04/24/2018  Home/Environment  Lives with Spouse., 04/24/2018  Nutrition/Health  Regular, 04/24/2018  Sexual  Sexually active: Yes., 04/24/2018  Substance Use  Tobacco  Never (less than 100 in lifetime), N/A, 08/01/2019  Never (less than 100 in lifetime), N/A, 07/05/2019  Family History  Acute myocardial infarction.: Father.  CABG (Coronary artery bypass grafting) planned: Father, Sister and Brother.  COPD (chronic obstructive pulmonary disease).: Sister.  Diabetes mellitus type 1.: Sister.  Hypertension.: Sister.  Leukemia: Father.  Primary malignant neoplasm of lung: Mother and Brother.  Stent: Sister.  Immunizations  Vaccine Date Status   influenza virus vaccine, inactivated 11/07/2018 Recorded   tetanus/diphtheria/pertussis, acel(Tdap) 12/25/2017 Given   pneumococcal 13-valent conjugate vaccine 10/24/2017 Given   influenza virus vaccine, inactivated  10/24/2017 Given   influenza virus vaccine, inactivated 10/25/2007 Recorded   Diagnostic Results  Accession:?[id number]  Order:?CT Head W/O Contrast  Report Dt/Tm:?09/09/2019 16:21  Report:?  Clinical History:  Altered level of consciousness  ?  Technique:  Axial CT of the brain were obtained without contrast. There are  osseous reconstructed images available for review with coronal and  sagittal reconstructions.  ?  Automatic exposure control was utilized to reduce the patients  radiation dose.  ?  Comparison:  September 5, 2019  ?  Findings:  No acute intracranial hemorrhage.  Diffuse cerebral atrophy with concordant ventricular enlargement.?  Scattered hypodensities throughout the deep periventricular white  matter.  Right parietal approach ventricular catheter is again noted.?  Postsurgical changes of occipital craniotomy.?  There is increasing hypodensity within the surgical bed which may be  related to edema and continued scoliosis or evolution. Infection is  not entirely excluded on the basis of CT as there is limited  evaluation.  The mastoid air cells are clear.?  The auditory canals are patent bilaterally.  The globes and orbital contents are normal bilaterally.  The visualized maxillary, ethmoid and sphenoid sinuses are clear.  ?  Impression  No acute intracranial hemorrhage. Findings of chronic microvascular  ischemic disease.  Postsurgical changes of occipital craniotomy.?  There is increasing hypodensity within the surgical bed which may be  related to edema and continued gliosis or evolution. Further  evaluation is limited on CT.  ?  ?  Accession:?[id number]  Order:?XR Chest 1 View  Report Dt/Tm:?09/09/2019 14:59  Report:?  ?  CLINICAL: Altered level of consciousness.  ?  COMPARISON: August 1, 2019.  ?? ? ? ? ? ? ? ? ??  FINDINGS: Cardiopericardial silhouette is within upper normal. Right  transjugular approach and right chest implanted tunneled portacatheter  terminate within the superior vena  cava. Within the left lower lung  zone patchy opacities which may represent atelectasis without  exclusion of developing infiltrates. Lungs otherwise are clear. No  fluid within the pleural spaces. Right axillary metallic clips.?  ?  IMPRESSION:  Left lower lung zone patchy atelectasis without exclusion of  developing infiltrates. Follow-up exams are recommended.  ?  ?

## 2022-09-13 NOTE — H&P
Patient:   Cathleen Shaikh            MRN: [id number]            FIN: [id number]               Age:   68 years     Sex:  Female     :  1950   Associated Diagnoses:   None   Author:   Shahriar Son MD      History of Present Illness   Patient is a 68-year-old female with h/o right breast infiltrating ductal adenocarcinoma stage IIIa diagnosed 10/2017 status post neoadjuvant AC/toxol and lumpectomy with axillary node dissection followed by adjuvant radiotherapy, diagnosed with solitary brain metastasis in 2019 in the left posterior fossa with vasogenic edema. She was initiated on Decadron, s/p CyberKnife treatment on 3/21/2019. She presented to [facility] for elective surgery with Dr. Lake. As per family she was mostly feeling lethargic and had multiple falls, with her symptoms worsening in last few weeks. Denied focal extremity weakness or numbness. She received 3 days of IV rocephin for Klebsiella UTI during hospital stay. Today she underwent posterior fossa craniectomy with tumor resection by Dr. Lake. She was moved to ICU for close monitoring.      Review of Systems   12 point ROS negative except for symptoms described in HPI      Health Status   Allergies:    Allergies (1) Active Reaction  No Known Allergies None Documented     Current medications:    Medications (38) Active  Scheduled: (13)  carvedilol 12.5 mg Tab UD  12.5 mg 1 tab(s), Oral, BID  ceFAZolin premix  1 gm 50 mL, IV Piggyback, q6hr  dexamethasone 4 mg Tab UD  4 mg 1 tab(s), Oral, BID  DEXAmethasone 4 mg/ml Inj-1ml  4 mg 1 mL, IV Push, q6hr  docusate sodium 100 mg Cap UD  100 mg 1 cap(s), Oral, BID  docusate sodium 100 mg Cap UD  200 mg 2 cap(s), Oral, BID  exemestane 25 mg Tab  25 mg 1 tab(s), Oral, Daily  gabapentin 300 mg Cap UD  600 mg 2 cap(s), Oral, TID  heparin 5000 units/ml Inj-1ml  5,000 units 1 mL, Subcutaneous, BID  insulin glargine 100 units/mL -10 mL VIAL  40 units 0.4 mL, Subcutaneous, At Bedtime  lisinopril 10 mg  Tab UD  20 mg 2 tab(s), Oral, Daily  metformin 500 mg Tab UD  1,000 mg 2 tab(s), Oral, BID  MultiVit(THERAGRAN-M Tab) with Minerals Tab  1 tab(s), Oral, Daily        Physical Examination      Vital Signs (last 24 hrs)_____  Last Charted___________  Temp Oral     37 DegC  (AUG 05 07:30)  Heart Rate Peripheral   H 105bpm  (AUG 05 16:30)  Resp Rate         16 br/min  (AUG 05 16:30)  SBP      135 mmHg  (AUG 05 16:30)  DBP      83 mmHg  (AUG 05 16:30)  SpO2      96 %  (AUG 05 16:45)     General:  confused at time of examination.    Eye:  Pupils are equal, round and reactive to light, Extraocular movements are intact, Normal conjunctiva.    HENT:  craniectomy incision noted.    Neck:  Supple, No jugular venous distention.    Respiratory:  Lungs are clear to auscultation, Breath sounds are equal, Symmetrical chest wall expansion.    Cardiovascular:  Normal rate, Regular rhythm, Good pulses equal in all extremities, No edema.    Gastrointestinal:  Soft, Normal bowel sounds.    Musculoskeletal:  Normal range of motion, Normal strength.       Review / Management   Results review:     Labs (Last four charted values)  WBC                  H 21.8 (AUG 05) 11.1 (AUG 02) H 16.3 (AUG 01)   Hgb                  13.2 (AUG 05) 12.9 (AUG 02) 14.1 (AUG 01)   Hct                  40.8 (AUG 05) 40.0 (AUG 02) 42.8 (AUG 01)   Plt                  166 (AUG 05) 196 (AUG 02) 245 (AUG 01)   Na                   143 (AUG 02) 138 (AUG 01)   K                    3.5 (AUG 02) 3.9 (AUG 01)   CO2                  30.0 (AUG 02) 28.0 (AUG 01)   Cl                   106 (AUG 02) 100 (AUG 01)   Cr                   0.81 (AUG 02) 1.02 (AUG 01)   BUN                  H 28.0 (AUG 02) H 35.0 (AUG 01)   Glucose Random       H 129 (AUG 02) H 217 (AUG 01) .       Impression and Plan     Right breast infiltrating ductal adenocarcinoma stage IIIA with brain metastasis  status post neoadjuvant AC/toxol and lumpectomy with axillary node dissection followed by  adjuvant radiotherapy  s/p CyberKnife treatment on 3/21/2019  s/p posterior fossa craniectomy with tumor resection on 08/05/2019  Klebsiella UTI - resolved, finished 3 days of IV rocephin    - Basically this is a 68-year-old female with breast cancer w/ brain mets admitted to ICU after posterior fossa craniectomy with tumor resection  - continue holding anticoagulation until cleared by neurosurgery  - blood pressure control as per post-craniectomy protocol  - monitor for post-op hyperthermia, seizures  - regular neurochecks  - continue dexamethasone 4mg q6hr IV to alleviate cerebral edema  - PT following the case  - SCDs for VTE prophylaxis

## 2022-09-13 NOTE — HISTORICAL [facility] CERNER
This is a historical note converted from [place]. Formatting and pictures may have been removed.  Please reference [place] for original formatting and attached multimedia. Chief Complaint  lethargic and multiple falls  History of Present Illness  This is a 68-year-old female with history of?right?breast infiltrating ductal adenocarcinoma?stage?IIIa?diagnosed 10/2017 status post? neoadjuvant AC/toxol and lumpectomy with?axillary node dissection?followed by?adjuvant XRT.?Found to have solitary brain metastasis?in March 2019 in the left posterior?fossa?with vasogenic edema started on dexamethasone and received CyberKnife?treatment on 3/21/2019. ?She is planned for?neurosurgical resection with Dr. Lake this Monday 8/5/2019.  ?  She presented to the ER today?with her ,?complaining?that?she has been?feeling lethargic and generally weak?and falling?multiple times.? This has been worsening over the past month.?She denies any pain,?fever,?chills, no?focal extremity weakness or numbness. ?This morning she has an MRI?brain?as part of her preop work-up- report pending.  ?  On arrival to ED, she was hemodynamically stable and afebrile.? Labs notable for hyperglycemia , leukocytosis (likely steroid-induced), urinalysis suggestive of UTI. CT head with no acute findings.  Review of Systems  Except as documented, all other systems reviewed and are negative.  Physical Exam  Vitals & Measurements  T:?37? ?C (Oral)? HR:?78(Peripheral)? HR:?76(Monitored)? RR:?20? BP:?154/67? SpO2:?97%? WT:?124?kg?  General: lethargic  HEENT:?NC/AT, dry oral mucosa  Neck:?No JVD, trachea at?midline  Chest:?CTABL, no rales or rhonchi, no accessory muscle use  CVS:?Regular rhythm. Normal S1/S2. No gallops, murmurs or rub. trace edema  Abdomen:?Nondistended, normoactive bowel sound, soft and non-tender.  Extremities:?no clubbing or cyanosis  Skin:?Warm and dry, no rashes or lesions  Neuro:?Arousable, oriented to person, disoriented to place  and time,?PERRLA, EOMI, no focal neurological deficit  Psych:?Cooperative  Assessment/Plan  Acute metabolic?encephalopathy  Acute UTI  Generalized weakness/physical deconditioning  Metastatic breast cancer  Solitary brain metastasis  T2DM/hyperglycemia  ?   Plan:  ?   - Urine culture  - Ceftriaxone 1g IV daily  - IV fluids  - ISS#2, Lantus 40 units Qhs  - MRI Brain?- read pending  - Consult  per family request  - Resume home Meds, hold HCTZ/lasix  - PT evaluation  ?  ?   VTE PPX: SCDs  Code status: Full   Problem List/Past Medical History  Ongoing  Brain metastasis  Diabetes mellitus, type II  Diabetic neuropathy  Fatigue  HTN (hypertension)  Malignant neoplasm of upper-outer quadrant of right breast in female, estrogen receptor positive  Morbid obesity  Postmenopausal state  Historical  Breast cancer  Dyspnea  Encounter for weight loss counseling  Left foot pain  Low TSH level  Tachycardia  Procedure/Surgical History  Hysterectomy  Knee replacement   Medications  Inpatient  acetaminophen, 650 mg= 20.3 mL, Oral, q6hr, PRN  acetaminophen, 1000 mg= 2 tab(s), Oral, q6hr, PRN  Aromasin, 25 mg= 1 tab(s), Oral, Daily  carvedilol 12.5 mg oral tablet, 12.5 mg= 1 tab(s), Oral, BID  cefTRIAXone, 1 gm= 50 mL, IV Piggyback, q24hr  Decadron 4 mg oral tablet, 4 mg= 1 tab(s), Oral, BID  Dextrose 10% in Water intravenous solution, 125 mL, IV, As Directed, PRN  Dextrose 10% in Water intravenous solution, 125 mL, IV, As Directed, PRN  Dextrose 10% in Water intravenous solution, 125 mL, IV, Once, PRN  Dextrose 10% in Water intravenous solution, 250 mL, IV, As Directed, PRN  glucagon, 1 mg= 1 EA, IM, q10min, PRN  glucagon, 1 mg= 1 EA, IM, q10min, PRN  insulin glargine 100 units/mL subcutaneous inj., 40 units= 0.4 mL, Subcutaneous, At Bedtime  insulin lispro, 2-14 units, Subcutaneous, As Directed, PRN  lisinopril, 20 mg= 2 tab(s), Oral, Daily  Neurontin 300 mg oral capsule, 600 mg= 2 cap(s), Oral, TID  NS (0.9% Sodium Chloride)  Infusion 1,000 mL, 1000 mL, IV  Zofran, 4 mg= 2 mL, IV Push, q4hr, PRN  Home  Aromasin 25 mg oral tablet, 25 mg= 1 tab(s), Oral, Daily, 6 refills  calcium carbonate 600 mg oral tablet, 600 mg= 1 tab(s), Oral, BID  carvedilol 12.5 mg oral tablet, 12.5 mg= 1 tab(s), Oral, BID  Centrum Silver oral tablet, 1 tab(s), Oral, Daily  Decadron 4 mg oral tablet, 4 mg= 1 tab(s), Oral, BID  hydrochlorothiazide 25 mg oral tablet, See Instructions, 11 refills  Lantus Solostar Pen 100 units/mL subcutaneous solution, See Instructions  Lasix 40 mg oral tablet, 40 mg, Oral, Daily, PRN, 11 refills  lisinopril 20 mg oral tablet, 20 mg= 1 tab(s), Oral, Daily  metFORMIN 1000 mg oral tablet, 1000 mg= 1 tab(s), Oral, BID  Neurontin 600 mg oral tablet, 600 mg= 1 tab(s), Oral, TID, 11 refills  temazepam 30 mg oral capsule, 30 mg= 1 cap(s), Oral, Once a day (at bedtime), PRN  Allergies  No Known Allergies  Social History  No smoking, EtOH, or illicit drug use  Family History  Acute myocardial infarction.: Father.  CABG (Coronary artery bypass grafting) planned: Father, Sister and Brother.  COPD (chronic obstructive pulmonary disease).: Sister.  Diabetes mellitus type 1.: Sister.  Hypertension.: Sister.  Leukemia: Father.  Primary malignant neoplasm of lung: Mother and Brother.  Stent: Sister.  Lab Results  Labs Last 24 Hours?  ?Chemistry? Hematology/Coagulation?   Sodium Lvl: 138 mmol/L (08/01/19 19:13:00) WBC:?16.3 x10(3)/mcL?High (08/01/19 19:13:00)   Potassium Lvl: 3.9 mmol/L (08/01/19 19:13:00) RBC: 4.34 x10(6)/mcL (08/01/19 19:13:00)   Chloride: 100 mmol/L (08/01/19 19:13:00) Hgb: 14.1 gm/dL (08/01/19 19:13:00)   CO2: 28 mmol/L (08/01/19 19:13:00) Hct: 42.8 % (08/01/19 19:13:00)   Calcium Lvl: 9.7 mg/dL (08/01/19 19:13:00) Platelet: 245 x10(3)/mcL (08/01/19 19:13:00)   Glucose Lvl:?217 mg/dL?High (08/01/19 19:13:00) MCV:?98.6 fL?High (08/01/19 19:13:00)   BUN:?35 mg/dL?High (08/01/19 19:13:00) MCH:?32.5 pg?High (08/01/19 19:13:00)    Creatinine: 1.02 mg/dL (08/01/19 19:13:00) MCHC:?32.9 gm/dL?Low (08/01/19 19:13:00)   eGFR-AA: >60 (08/01/19 19:13:00) RDW: 13.1 % (08/01/19 19:13:00)   eGFR-ALLIE: 58 mL/min/1.73 m2 (08/01/19 19:13:00) MPV: 11 fL (08/01/19 19:13:00)   Bili Total: 0.4 mg/dL (08/01/19 19:13:00) Abs Neut:?13.23 x10(3)/mcL?High (08/01/19 19:13:00)   Bili Direct: 0.1 mg/dL (08/01/19 19:13:00) Neutro Auto: 81 % (08/01/19 19:13:00)   Bili Indirect: 0.3 mg/dL (08/01/19 19:13:00) Lymph Auto: 12 % (08/01/19 19:13:00)   AST:?11 unit/L?Low (08/01/19 19:13:00) Mono Auto: 6 % (08/01/19 19:13:00)   ALT: 32 unit/L (08/01/19 19:13:00) Basophil Auto: 0 % (08/01/19 19:13:00)   Alk Phos:?128 unit/L?High (08/01/19 19:13:00) Abs Neutro:?13.23 x10(3)/mcL?High (08/01/19 19:13:00)   Total Protein: 7.1 gm/dL (08/01/19 19:13:00) Abs Lymph: 2 x10(3)/mcL (08/01/19 19:13:00)   Albumin Lvl: 3.5 gm/dL (08/01/19 19:13:00) Abs Mono: 1 x10(3)/mcL (08/01/19 19:13:00)   Globulin:?3.6 gm/dL?High (08/01/19 19:13:00) Abs Baso: 0 x10(3)/mcL (08/01/19 19:13:00)   A/G Ratio:?1 ratio?Low (08/01/19 19:13:00)    Troponin-I: <0.02 (08/01/19 19:13:00)    Diagnostic Results  Accession:?[id number]  Order:?CT Head W/O Contrast  Report Dt/Tm:?08/01/2019 20:11  Report:?  CT Head  ?  Indication: Recent fall, history of brain metastasis  ?  Comparison: CT head 12/25/2017  ?  Technique: Axial CT images were obtained through the head without  contrast. ?Coronal and sagittal reconstructions submitted and  interpreted. ? Total . Automated exposure control utilized.  ?  Findings:  ?  No hemorrhage, mass effect or CT evidence of acute cortical  infarction. Periventricular white matter hypodensities may relate to  small vessel ischemic disease. Ventricles are slightly enlarged out of  proportion with the sulci.?  ?  No abnormal extra-axial fluid collections.  ?  No hyperdense artery or vein.  ?  No skull fracture. Visualized paranasal sinuses and mastoid air cells  are  clear.  ?  IMPRESSION:  ?  No skull fracture or acute intracranial findings

## 2022-09-13 NOTE — H&P
Patient:   Cathleen Shaikh            MRN: [id number]            FIN: [id number]               Age:   68 years     Sex:  Female     :  1950   Associated Diagnoses:   None   Author:   Silvestre Wren MD      Date of Service: 2019       Chief Complaint   Breast cancer with brain metastasis s/p posterior fossa craniectomy with resection of cerebellar lesion 2019 and  shunt placement on 2019      History of Present Illness   68-year-old WF presented to [facility] ED with lethargy, generalized weakness and multiple falls.  PMH significant for right breast infiltrating ductal adenocarcinoma stage IIIa diagnosed 2016 s/p neoadjuvant AC/toxol and lumpectomy with axillary node dissection followed by adjuvant radiotherapy.  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  Decadron initiated.  Tolerated CyberKnife treatment on 3/21/2019.  ED work up significant for UTI and leukocytosis (likely steroid-induced).  Rocephin initiated.  CT head unremarkable.  Tolerated posterior fossa craniectomy with resection of cerebellar lesion/tumor using microdissection on 2019.  Increased lethargy on  and repeat CT head with no changes.  Tolerated  shunt placement 2/2 chronic hydrocephalus with ventricular dilatation on .  Mental status labile throughout inpatient course.  Repeat head CT significant for increased visibility of gyri/sulci on  indicative of functioning  shunt per neurosurgery.  Rocephin discontinued and doxycycline initiated on .  Tolerated transfer to [facility] ([facility] SNF unit on 8/15 without incident.     Patient sitting comfortably in wheelchair at bedside.   present.  Reports compliance with prescribed therapies.  Admits to poor fluid intake.  No bowel or bladder complaints.  Reports good sleep.  Appetite poor.  Denies nausea or vomiting.  No chest pains or palpitations.  Adequate pain control.  No  reported falls.  Uses wheelchair for mobility.  Has been expresses some concern about episodic involuntary twitching.  I was not able to witness the twitching.  Vitals stable with no recent recorded fevers.  Adequate glycemic control.  Labs reviewed.  BUN/creatinine 30.0/1.24.  Prealbumin 22.7.  Platelets 124,000.  Labs otherwise unremarkable.  No new imaging.      Histories   Past Medical History: Breast cancer diagnosed in 2016, Brain metastasis diagnosed in 2018, DM type II, Diabetic neuropathy, HTN, Morbid obesity, Dyspnea, Low TSH level, Hydrocephalus   Family History: Father- in 60s, PMH of leukemia+ MI+ CABG, mother- in 60s, PMH of lung cancer, sister-PMH of DM type II+ CABG+HTN+ COPD, brother-PMH of lung cancer+ CABG   Procedure history: Posterior fossa craniectomy, resection of cerebellar lesion/tumor using microdissection on 2019, Right occipital bur hole, placement of ventricular drain on 2019, Hysterectomy, Knee replacement, Cervical fusion, Lymphadenectomy/lumpectomy    Social History     (-) TOB.     (-) EtOH.     (-) illicit drug use.     Disabled.   to Rowdy Shaikh.  Two adult children (Rowdy and Jennifer).        Review of Systems   Complete 12-point review of systems negative except for HPI      Health Status   Allergies:    Allergic Reactions (Selected)  No Known Allergies   Current medications:  (Selected)   Inpatient Medications  Ordered  Aromasin 25 mg oral tablet: 1 tab, Oral, Daily, first dose 19 9:00:00 CDT  Colace 100 mg oral capsule: 100 mg, form: Cap, Oral, BID, first dose 19 9:00:00 CDT  Dexamethasone 0.5 m tabs = 1 mg, form: Dose, Oral, BID, first dose 19 9:00:00 CDT, stop date 19 8:59:00 CDT  Dextrose 50% in Water intravenous solution: 12.5 mL, IV, PRN blood glucose, start date 19 9:53:00 CDT  Humulin R (for Custom Sliding Scale): 2-10 units based on sliding scale, form: Injection, Subcutaneous, As Directed PRN for blood glucose,  first dose 08/16/19 8:45:00 CDT  Lantus 100 U/mL (per 1 unit): 10 units, form: Injection, Subcutaneous, At Bedtime, first dose 08/16/19 9:45:00 CDT  NS (0.9% Sodium Chloride) Infusion: 1,000 mL, 1,000 mL, IV, 100 mL/hr, start date 08/19/19 6:00:00 CDT, stop date 08/19/19 6:00:00 CDT  Neurontin 300 mg oral capsule: 600 mg, form: Cap, Oral, TID, first dose 08/16/19 9:00:00 CDT  Pepcid 20 mg oral tablet: 20 mg, form: Tab, Oral, BID PRN for indigestion, first dose 08/16/19 8:44:00 CDT  Tessalon Perles: 100 mg, form: Cap, Oral, TID PRN for cough, first dose 08/16/19 8:44:00 CDT  Thera-M oral tablet: 1 tabs(s), form: Tab, Oral, Daily, first dose 08/16/19 9:00:00 CDT  Tylenol 325 mg oral tablet: 650 mg, form: Tab, Oral, q4hr PRN for pain, first dose 08/17/19 17:57:00 CDT  Zofran 4 mg oral tablet: 4 mg, form: Tab, Oral, TID PRN for nausea, first dose 08/16/19 8:44:00 CDT  calcium carbonate: 500 mg, form: Tab-Chew, Oral, BID, first dose 08/16/19 9:00:00 CDT  carvedilol 12.5 mg oral tablet: 12.5 mg, form: Tab, Oral, BID, first dose 08/16/19 9:00:00 CDT  glucagon: 1 mg, form: Injection, Subcutaneous, As Directed PRN for blood glucose, first dose 08/16/19 8:45:00 CDT  heparin 5000 units/mL injectable solution: 5,000 units, form: Injection, Subcutaneous, BID, first dose 08/16/19 21:00:00 CDT  hydrALAZINE (Apresoline) Inj.: 10 mg, form: Injection, IV Push, q2hr PRN for hypertension, first dose 08/16/19 8:44:00 CDT  hydrALAZINE 25 mg oral tablet: 25 mg, form: Tab, Oral, TID, first dose 08/16/19 9:00:00 CDT  labetalol 5 mg/mL intravenous solution: 10 mg, form: Soln, IV Push, q2hr PRN for hypertension, first dose 08/16/19 8:44:00 CDT  lisinopril 20 mg oral tablet: 20 mg, form: Tab, Oral, Daily, first dose 08/16/19 9:00:00 CDT  metFORMIN 1000 mg oral tablet: 1,000 mg, form: Tab, Oral, BID, first dose 08/16/19 9:00:00 CDT  temazepam: 30 mg, form: Cap, Oral, Once a day (at bedtime) PRN for sleep, first dose 08/16/19 8:41:00 CDT       Physical Examination      Vital Signs (last 24 hrs)_____  Last Charted___________  Temp Oral         36.7 DegC  (AUG 19 07:00)  Heart Rate Peripheral   89 bpm  (AUG 19 07:00)  Resp Rate             18 br/min  (AUG 19 07:00)  SBP      137 mmHg  (AUG 19 07:00)  DBP      85 mmHg  (AUG 19 07:00)  SpO2      95 %  (AUG 19 07:00)  Weight      115.7 kg  (AUG 19 04:00)     General:  No acute distress, lethargic.    Eye:  Vision unchanged.    HENT:  Normocephalic, Steri strips to posterior head, clean/dry dressing to right parietal region.    Neck:  Supple.    Respiratory:  Lungs are clear to auscultation, Respirations are non-labored, Breath sounds are equal, Symmetrical chest wall expansion.    Cardiovascular:  Normal rate, Regular rhythm, Normal peripheral perfusion.    Gastrointestinal:  Soft, Non-tender, Normal bowel sounds, steri strips to navel and right abdomen, Obese abdomen.    Musculoskeletal:  generalized weakness, follows commands, purposeful movement of extremities.    Integumentary:  Warm, Dry.    Neurologic:  Alert, oriented to self, city (not to year or president).    Cognition and Speech:  slow to respond, but answers some questions appropriately.    Psychiatric:  Cooperative.       Review / Management   Results review:  All Results   8/19/2019 0:45 CDT       WBC                       9.3 x10(3)/mcL                             RBC                       3.95 x10(6)/mcL  LOW                             Hgb                       13.1 gm/dL                             Hct                       39.3 %                             Platelet                  124 x10(3)/mcL  LOW                             MCV                       99.5 fL  HI                             MCH                       33.2 pg  HI                             MCHC                      33.3 gm/dL                             RDW                       13.6 %                             MPV                       12.1 fL  HI                              Abs Neut                  6.59 x10(3)/mcL                             Neutro Auto               70.9 %                             Lymph Auto                19.0 %                             Mono Auto                 7.0 %                             Eos Auto                  1.5 %                             Abs Eos                   0.14 x10(3)/mcL                             Basophil Auto             0.2 %                             Abs Neutro                6.59 x10(3)/mcL                             Abs Lymph                 1.77 x10(3)/mcL                             Abs Mono                  0.65 x10(3)/mcL                             Abs Baso                  0.02 x10(3)/mcL                             NRBC%                     0.0 %                             IG%                       1.400 %  HI                             IG#                       0.1300  HI                             Sodium Lvl                136 mmol/L                             Potassium Lvl             4.5 mmol/L                             Chloride                  104 mmol/L                             CO2                       25.0 mmol/L                             Calcium Lvl               9.2 mg/dL                             Glucose Lvl               203 mg/dL  HI                             BUN                       30.0 mg/dL  HI                             Creatinine                1.24 mg/dL  HI                             eGFR-AA                   56 mL/min/1.73 m2  NA                             eGFR-ALLIE                  46 mL/min/1.73 m2  NA                             Bili Total                0.4 mg/dL                             Bili Direct               <0.10 mg/dL                             Bili Indirect             >0.30 mg/dL                             AST                       29 unit/L                             ALT                       64 unit/L  HI                             Alk Phos                   68 unit/L                             Total Protein             6.4 gm/dL                             Albumin Lvl               2.9 gm/dL  LOW                             Globulin                  4 gm/dL                             A/G Ratio                 0.7  NA                             Prealbumin                22.7 mg/dL                             TSH                       0.600 mIU/mL  .    Radiology results   CT, Without contrast, Head , Reported at  8/1/2019 20:09:00, Interpretation:  No skull fracture or acute intracranial findings   Radiology results   CT, Without contrast, Head , Reported at  8/12/2019 08:26:00, Interpretation:  Increased pneumocephalus along the ventriculostomy catheter is of unclear significance. No other significant change.   Radiology results   X-ray, KUB , Reported at  8/2/2019 14:45:00, Interpretation:  Nonobstructed bowel gas pattern.      Impression and Plan   67 yo WF admitted on 8/15/2019    Breast cancer with brain metastasis   -  shunt placement on 8/9/2019  - s/p posterior fossa craniectomy with resection of cerebellar lesion 8/5/2019   - s/p chemo/radiation in 2016 and 2019 (CyberKnife treatment 03/2019)  - s/p lymphadenectomy 2018  - continue     Aromasin 25 mg daily    Dexamethasone 1mg twice daily (wean per neurosurgery)  - initiate    Keppra 500 mg twice daily  - closely monitor for changes in mental status   - wound care nurse following    Debility  - 2/2 Breast cancer with brain metastasis s/p posterior fossa craniectomy with resection of cerebellar lesion 8/5/2019 and  shunt placement on 8/9/2019  - PT/OT/ST to evaluate and treat  - OOB with all meals  - blinds open during the day   - reorient as needed    Morbid obesity  - monitor weight  - dietary to follow   -  on weight loss/low fat diet when appropriate  - encourage participation in therapy/physical exercise     DM type II  - HgA1c 8.0 on 8/2  - poor glycemic control likely 2/2 steroids  -  continue    Metformin 1000 mg twice daily    Lantus 10 units at bedtime    ISS   - initiate    Actos 15 mg qday    HTN  - BP well controlled  -continue    Coreg 12.5 mg twice daily    Lisinopril 20 mg daily    Hydralazine 25 mg 3 times daily    Hydralazine 10 mg every 2 hours as needed for BP > 160/90    Labetalol 10 mg IV push every 2 hours as needed  - Low-sodium diet     Dysphagia  - mechanically altered diet   - speech following  - monitor closely     Diabetic neuropathy   - stable  - continue    Neurontin 600 mg 3 times daily    Constipation  - stable  - continue    Colace 100 mg twice daily    UTI  - resolved    AB therapy  Doxycycline 100 mg 8/14-8/18  Rocephin 8/2-8/5 and 8/11-8/14  Cefazolin 8/5 and 8/9    VTE Prophylaxis: Heparin 5000 units twice daily    POA: Yes, Rowdy Shaikh () [phone]  Living will: No  Contacts:  Rowdy Shaikh ()  [phone]    CODE STATUS: FULL CODE  Internal Medicine: Silvestre Wren MD    OUTPATIENT PROVIDERS  PCP:  Deep Agustin MD  Neurosurgery:  Katie Lake MD  Radiation oncology:  Wayne Kumar MD  Medical oncology:   MD Joyce Palma MD    DISPOSITION: Condition stable.  Initiate Keppra 500 mg twice daily, as I suspect periodic involuntary twitching are likely partial seizures.  Initiate Actos 15 mg daily for improved glycemic control.  Continue aggressive therapies as tolerated.  Encourage nutrition.    Total time spent on this encounter including chart review and direct 1-on-1 patient interaction: 56 minutes   Over 50% of this time was spent in counseling and coordination of care

## 2022-09-13 NOTE — OP NOTE
DATE OF SURGERY:    08/05/2019    SURGEON:  Katie Lake MD    PREOPERATIVE DIAGNOSIS:  Left cerebellar metastasis, status post growth despite radiation and chemotherapy.    POSTOPERATIVE DIAGNOSIS:  Left cerebellar metastasis, status post growth despite radiation and chemotherapy.    PROCEDURE:  Posterior fossa craniectomy for removal and resection of cerebellar lesion using microdissection.  Stealth system was used.  Frozen and permanent sent, frozen consistent with metastatic adenocarcinoma.    COMPLICATIONS:  There were no complications.    INDICATION FOR SURGERY:  Patient is a 68-year-old lady who presented with metastasis to the brain with prior radiation and chemo.  The lesion has grown. So subsequently as a result, she has been admitted for multiple other problems, but also wants to proceed with the surgery.  The risks of the surgery, including weakness, paralysis, bleeding, infection, hemorrhage, possibility this may not get better, possibly need for further surgery, and complications discussed in detail.  Consent form discussed in detail.  They understand and want me to proceed with the surgery.  I went over it some time with her  and her son.    PROCEDURE IN DETAIL:  The patient was brought to the operating room, intubated, head put in pins, turned prone.  Back of the neck was shaved, sterilely prepped and draped.  We monitored on the Stealth system.  Subsequent made an incision in the midline, put the retractors, and went down to the skull into the C1 area.  We exposed the bone and the skull, and then we drilled the bone down and put several holes.  Once the bone was off, we were able to tell exactly where the lesion was.  We opened up the dura and you could see the difference in the brain and where the lesion was.  We started dissecting in the plane, gradually removing in all directions medially, superiorly, laterally, inferiorly, going to the tentorium superiorly.  We were able to core  out a nice material and we sent frozen, consistent with adenocarcinoma.  We sent more and more material until the hemostasis obtained.  FloSeal was used.  Multiple sutures were used.  Once that was done, we found in all directions tumor removal.  We then put a patch and sutured it in place, and irrigated and put fibrin glue and closed it up completely.  The wound was irrigated multiple times, hemostasis obtained, and the muscle was closed with 0 Vicryl and 3-0 Vicryl running subcu.      Postoperatively, she was recovering, initially weak, but by the next day, she was moving arms and legs well, following commands.  There were no complications from my standpoint.        ______________________________  MD ISABEL Wu/REED  DD:  08/06/2019  Time:  06:52AM  DT:  08/06/2019  Time:  07:25AM  Job #:  [id number]

## 2022-09-13 NOTE — CONSULTS
Patient:   Cathleen Shaikh            MRN: [id number]            FIN: [id number]               Age:   68 years     Sex:  Female     :  1950   Associated Diagnoses:   None   Author:   Ned Headley MD      Basic Information   68-year-old white female with a static breast cancer followed by Dr. Holden in [place]. She was initially diagnosed with locally advanced stage IIIa breast cancer 10/17, ER/MO + and HER-2 negative. She received neoadjuvant dose dense AC x4 followed by weekly Taxol for 7 doses (stopped due to neuropathy). She underwent lumpectomy and axillary lymph node dissection 3/27/18 followed by adjuvant radiation therapy and hormonal therapy with an aromatase inhibitor. She developed a symptomatic left cerebellar mass 3/19 treated with initial CyberKnife therapy. The lesion progressed on follow-up and she underwent resection 19. Pathology was confirmed as metastatic breast cancer. Postoperative course was complicated by development of hydrocephalus requiring placement of a  shunt. She was discharged from the hospital to [facility] to continue physical therapy. Her condition did not improve despite aggressive therapy. She became confused and had left-sided weakness. MRI of the brain 19 showed postoperative changes from her occipital craniotomy with faint linear enhancement along the margins of the left cerebellar resection cavity. There was significant leptomeningeal enhancement in the posterior fossa, basal cisterns, sylvian fissures and along the anterior interhemispheric fissure. This enhancement had progressed since a prior MRI from 19.  shunt was in place without significant hydrocephalus. She was readmitted to [facility] 19 due to further progression of her symptoms. She was evaluated by Neurosurgery in her  shunt was functioning normally. She was also evaluated by neurology who reviewed her MRI films which are compatible with leptomeningeal carcinomatosis. I was consulted  for a Medical Oncology opinion.    Patient is awake but confused and unable to answer questions. She has difficulty following even simple commands. Her , son and daughter-in-law are at the bedside. They just met with Neurology and reviewed her MRI films. The diagnosis of leptomeningeal carcinomatosis and poor prognosis was reviewed and discussed with them. Hospice care was recommended. They understand the diagnosis and do not want her to suffer.      Chief Complaint   Confusion and left-sided weakness      Review of Systems   Unobtainable secondary to neurologic status    PMHX:  HTN, diabetes mellitus with neuropathy, obesity, breast cancer, arthritis    PSHX:  Cervical fusion, carpal tunnel, knee replacement, hysterectomy, right lumpectomy, MediPort, occipital craniotomy,  shunt    SH:  Nonsmoker, no significant alcohol use. Lives in [place] with her .    FH:  Her father had leukemia. Her mother and brother had lung cancer.      Health Status   Allergies:    Allergic Reactions (Selected)  No Known Allergies   Current medications:    Medications (26) Active  Scheduled: (12)  balsam [place]-castor oil topical 87 mg-788 mg/g Oint  1 iam, TOP, TID  calcium carbonate(TUMS) 500 mg Sugar  500 mg 1 tab(s), Oral, BID  carvedilol 12.5 mg Tab UD  25 mg 2 tab(s), Oral, BID  cefTRIAXone  1 gm 50 mL, IV Piggyback, q24hr  DEXAmethasone 4 mg/ml Inj-1ml  4 mg 1 mL, IV Push, BID  docusate sodium 100 mg Cap UD  100 mg 1 cap(s), Oral, BID  gabapentin 300 mg Cap UD  300 mg 1 cap(s), Oral, TID  hydrALAzine 25 mg Tab UD  25 mg 1 tab(s), Oral, TID  insulin glargine 100 units/mL -10 mL VIAL  30 units 0.3 mL, Subcutaneous, At Bedtime  levETIRAcetam  500 mg 100 mL, IV Piggyback, BID  lisinopril 10 mg Tab UD  40 mg 4 tab(s), Oral, At Bedtime  miconazole topical 2% Pow  1 iam, TOP, TID  Continuous: (1)  sodium chloride 0.45% 1,000 mL  1,000 mL, IV, 50 mL/hr  PRN: (13)  acetaminophen 500 mg Tab  1,000 mg 2 tab(s), Oral,  q6hr  acetaminophen 650 mg/20.3mL Liqu Adult UD  650 mg 20.3 mL, Oral, q6hr  Dextrose 10% in Water Soln - 250 mL  125 mL, IV, As Directed  Dextrose 10% in Water Soln - 250 mL  125 mL, IV, As Directed  Dextrose 10% in Water Soln - 250 mL  125 mL, IV, Once  Dextrose 10% in Water Soln - 250 mL  250 mL, IV, As Directed  glucagon recombinant 1 mg Inj  1 mg 1 EA, IM, q10min  glucagon recombinant 1 mg Inj  1 mg 1 EA, IM, q10min  hydrALAzine 20 mg/ml Inj- 1 mL  20 mg 1 mL, IV Push, q2hr  insulin lispro 100 units/mL - 10mL vial  2-14 units, Subcutaneous, As Directed  labetalol 5 mg/mL 20mL vial  10 mg 2 mL, IV Push, q2hr  metoprolol 1 mg/ml Inj-5 ml  5 mg 5 mL, IV Push, q6hr  ondansetron 2 mg/mL inj - 2mL  4 mg 2 mL, IV Push, q4hr        Physical Examination      Vital Signs (last 24 hrs)_____  Last Charted___________  Temp Oral     37.5 DegC  (SEP 13 13:14)  Heart Rate Peripheral  78 bpm  (SEP 13 13:14)  Resp Rate         18 br/min  (SEP 12 15:31)  SBP      H 161mmHg  (SEP 13 13:14)  DBP      82 mmHg  (SEP 13 13:14)  SpO2      99 %  (SEP 13 13:14)     General:  Morbidly obese white female lying in bed in no acute distress. Awake and alert but confused and disoriented.    Eye:  Pupils are equal, round and reactive to light, Normal conjunctiva, Sclera nonicteric.    HENT:  Normocephalic, No pharyngeal erythema.    Neck:  Supple, Non-tender, No lymphadenopathy.    Respiratory:  Lungs clear anteriorly.    Cardiovascular:  Normal rate, Regular rhythm, No murmur, Mediport catheter right chest wall, nontender.    Gastrointestinal:  Soft, Non-tender, Non-distended, Obese. Decreased bowel sounds. No palpable masses.    Lymphatics:  No lymphadenopathy neck, axilla, groin.    Musculoskeletal:  No tenderness, Trace lower extremity edema.    Neurologic:  Alert, Confused, able to follow some simple commands with prompting. Left-sided weakness, Not oriented.       Review / Management   Laboratory Results   Today's Lab Results : Jenna  Discrete Results   9/13/2019 6:32 CDT       WBC                       8.0 x10(3)/mcL                             RBC                       3.57 x10(6)/mcL  LOW                             Hgb                       11.5 gm/dL  LOW                             Hct                       35.8 %  LOW                             Platelet                  183 x10(3)/mcL                             MCV                       100.3 fL  HI                             MCH                       32.2 pg  HI                             MCHC                      32.1 gm/dL  LOW                             RDW                       13.7 %                             MPV                       12.1 fL                             Abs Neut                  5.92 x10(3)/mcL                             Neutro Auto               74 %  NA                             Lymph Auto                16 %  NA                             Mono Auto                 7 %  NA                             Basophil Auto             0 %  NA                             Abs Neutro                5.92 x10(3)/mcL                             Abs Lymph                 1.3 x10(3)/mcL                             Abs Mono                  0.6 x10(3)/mcL                             Abs Baso                  0.0 x10(3)/mcL                             Sodium Lvl                151 mmol/L  HI                             Potassium Lvl             3.6 mmol/L                             Chloride                  114 mmol/L  HI                             CO2                       29.0 mmol/L                             Calcium Lvl               8.9 mg/dL                             Glucose Lvl               262 mg/dL  HI                             BUN                       31.0 mg/dL  HI                             Creatinine                0.62 mg/dL                             BUN/Creat Ratio           50.0  NA                             eGFR-AA                   >60  mL/min/1.73 m2  NA                             eGFR-ALLIE                  >60 mL/min/1.73 m2  NA           Impression and Plan     IMPRESSION:  Metastatic breast cancer - ER/ME +, HER-2 negative  Leptomeningeal carcinomatosis by MRI and clinical symptoms  Diabetes mellitus    PLAN:  The diagnosis and dismal prognosis associated with leptomeningeal carcinomatosis due to breast cancer was reviewed and discussed with the patient's  and family.  They were able to review her MRI films with Neurology and understand that her neurologic status will continue to worsen over time.  Treatment options are extremely limited and I do not believe she would tolerate intrathecal chemotherapy. Any benefit from treatment would likely be of short duration.  I agree with the recommendations for comfort care and hospice. Her family is in agreement, they want her to be comfortable.  Consult [facility] for evaluation. She may be a candidate for the [facility].  Please do not hesitate contact me if I can be of any assistance. I will continue to check in on her family.      LOREE LOPES M.D.

## 2022-09-13 NOTE — ED PROVIDER NOTES
"   Patient:   Cathleen Shaikh            MRN: [id number]            FIN: [id number]               Age:   68 years     Sex:  Female     :  1950   Associated Diagnoses:   Altered mental status; Status post craniotomy; Acute UTI   Author:   Ruchi Garcia DO      Basic Information   Time seen: Date & time 2019 14:00:00.   History source: Spouse, EMS.   Arrival mode: Ambulance.   History limitation: Clinical condition.   Additional information: Patient's physician(s): Aleksandra FORDE, José Wilson MD , Wayne CHAUDHARY, Vamsi FORED, Deep CORTES, Chief Complaint from Nursing Triage Note : Chief Complaint   2019 13:12 CDT       Chief Complaint           Patient sent from Ortho ext care from [facility] since brain tumor removal  worsening yesterday, sx by Dr Quick  (Modified) .      History of Present Illness   The patient presents with altered mental status, Patient from ortho extended care for AMS. Patient with recent surgery for removal of brain tumor (massimo, NP).  and     I, Dr. Garcia, assumed care of this patient at 1513.    67 y/o CF presents to the ED with her  via EMS, from Ortho extended care for AMS that has worsened since the removal of a brain tumor on 19.  reports that his wife has been "going downhill" since her surgery in August. He states that she had the tumor removed on  and was better for a couple of days before having a shunt inserted on . He states that the patient was then transferred to a rehab facility where she was being followed by Dr. Wren and Kathy Swanson P.  states that his wife has had altered speech, increased confusion, and has lost the ability to stand. He notes that she has been vomiting once a week, but that has increased withing the last week to multiple times per day. He reports that she has had multiple UTIs previously and has had one recently but does not think it ever went away. Previous chemo and radiation treatment. .  The onset was Worsening " since brain surgery on 8/5.  The course/duration of symptoms is worsening.  The character of symptoms is confused.  The degree at onset was moderate.  The degree at present is moderate.  The exacerbating factor is none.  The relieving factor is none.  Risk factors consist of recent surgery (brain tumor removal on 8/5 and shunt insertion on 8/9) and immunocompromised patient (Previous chemo and radiation treatments for breast CA and brain tumor).  Prior episodes:.  Therapy today: emergency medical services.  Associated symptoms: nausea and vomiting.  Additional history: none.        Review of Systems             Additional review of systems information: Unable to obtain due to: Clinical condition, altered mental status.      Health Status   Allergies:    Allergic Reactions (Selected)  No Known Allergies.   Medications: Per nurse's notes.      Past Medical/ Family/ Social History   Medical history:   Altered mental status   Brain metastasis   Cognitive functions   Diabetes mellitus, type II   Diabetic neuropathy   Fatigue   Fluid volume deficit risk   HTN (hypertension)   Impaired mobility   Impaired skin integrity   Malignant neoplasm of upper-outer quadrant of right breast in female, estrogen receptor positive   Malnutrition   Mood alteration   Morbid obesity   Pain management   Postmenopausal state   Total self-care deficit .   Surgical history:    Shunt Ventriculoperitoneal with Stealth (.) on 8/9/2019 at 68 Years.  Comments:  8/9/2019 8:34 Faye Watson RN  auto-populated from documented surgical case  Laparoscopic Shunt Ventriculo Peritoneal (.) on 8/9/2019 at 68 Years.  Comments:  8/9/2019 8:34 Faye Watson RN  auto-populated from documented surgical case  Craniotomy Posterior Fossa Decompression (.) on 8/5/2019 at 68 Years.  Comments:  8/5/2019 14:50 AZALEA Live RN, Bre HERNANDEZ  auto-populated from documented surgical case  Knee replacement ([id number]).  Comments:  10/24/2017 10:11 AZALEA Dc LPN  Shae KAPLAN  Bilateral knee replacements.  Hysterectomy ([id number]).  Comments:  10/24/2017 10:12 CDT - Shae Dc LPN  Partial hysterectomy  Mastectomy, partial (eg, lumpectomy, tylectomy, quadrantectomy, segmentectomy); ([id number]).  cervical fusion.  carpel tunnel surgery.  [facility]..   Family history:    Stent  Sister  Primary malignant neoplasm of lung  Mother  Brother  Leukemia  Father  Acute myocardial infarction.  Father  Hypertension.  Sister  Diabetes mellitus type 1.  Sister  COPD (chronic obstructive pulmonary disease).  Sister  CABG (Coronary artery bypass grafting) planned  Father  Brother  Sister  .   Social history: Alcohol use: Denies, Tobacco use: Denies, Drug use: Denies, Occupation: Retired, Family/social situation: , intact family.      Physical Examination               Vital Signs   Vital Signs   9/9/2019 15:14 CDT       Peripheral Pulse Rate     111 bpm  HI                             Heart Rate Monitored      114 bpm  HI                             Respiratory Rate          20 br/min                             SpO2                      95 %                             Oxygen Therapy            Nasal cannula                             Oxygen Flow Rate          2 L/min                             Systolic Blood Pressure   130 mmHg                             Diastolic Blood Pressure  95 mmHg  HI                             Mean Arterial Pressure, Cuff              107 mmHg  .   Measurements   9/9/2019 13:12 CDT       Weight Dosing             118 kg                             Weight Measured and Calculated in Lbs     260.14 lb                             Weight Estimated          118 kg    9/9/2019 6:00 CDT        Weight Measured           110.4 kg    9/9/2019 5:00 CDT        Weight                    Not Done  (Not Done)   9/8/2019 5:00 CDT        Weight Measured           113.0 kg  .   Basic Oxygen Information   9/9/2019 15:14 CDT       SpO2                      95 %                "              Oxygen Flow Rate          2 L/min                             Oxygen Therapy            Nasal cannula  .   General:  Alert.   [place] coma scale:  Eye response: 3 /4, verbal response: 4 /5, motor response: 6 /6, Total score: Total score: 13.    Neurological:  Motor strength 4/5 in all extremities, Cognitive function: Oriented x 1, to person, Speech: Slurred, [place] coma scale: Eyes open 3 /4, verbal response 4 /5, motor response 6 /6, total score 13.    Skin:  Warm, dry, no pallor.    Head:  Normocephalic, atraumatic.    Neck:  Supple, trachea midline.    Eye:  Pupils are equal, round and reactive to light, extraocular movements are intact, normal conjunctiva.    Ears, nose, mouth and throat:  External ear: Bilateral, normal, Nose: Bilateral nares, normal, Mouth: Normal, oral mucosa moist.    Cardiovascular:  Regular rate and rhythm, No murmur, Normal peripheral perfusion.    Respiratory:  Lungs are clear to auscultation, respirations are non-labored.    Gastrointestinal:  Soft, Nontender, Non distended.    Musculoskeletal:  Normal ROM.   Psychiatric      Medical Decision Making   Differential Diagnosis:  Confusion, dementia, cerebral vascular accident, hypoglycemia, urinary tract infection, pneumonia, delirium, dehydration, intracranial hemorrhage.    Documents reviewed:  Emergency department nurses' notes.   Orders  Launch Order Profile (Selected)   Inpatient Orders  Ordered  30 Day Readmission: 19 13:18:44 CDT, Stop date 19 13:18:44 CDT, "This patient has had an inpatient, observation, outpatient bedded or emergency visit within the last 30 days."  Blood Glucose Monitoring POC: 19 13:59:00 CDT, Once, Stop date 19 13:59:00 CDT, 19 13:59:00 CDT  Blood Pressure: 19 13:59:00 CDT, Stop date 19 13:59:00 CDT, Place on monitor.  Monitor blood pressure.  Cardiac Monitorin19 13:59:00 CDT, Constant Order, Place on telemetry.  EKG Adult 12 Lead: 19 " 13:59:00 CDT, Stat, 09/09/19 13:59:00 CDT, Stretcher, Standard Precautions, -1, -1, 09/09/19 13:59:00 CDT  NPO: 09/09/19 13:59:00 CDT, CM NPO  O2 Therapy: 09/09/19 13:18:44 CDT  Oxygen Therapy: 09/09/19 13:59:00 CDT, Stat, Nasal Cannula, Maintain saturation of at least 92%., CM Oxygen  Pulse Oximetry: 09/09/19 13:59:00 CDT, Stop date 09/09/19 13:59:00 CDT, Place on pulse oximetry.  Monitor oxygen saturation.  Saline Lock Insert: 09/09/19 13:59:00 CDT, Stop date 09/09/19 13:59:00 CDT  upon arrival.: 09/09/19 13:59:00 CDT, upon arrival.  Ordered (Dispatched)  Drug Screen Urine: Stat collect, Urine, 09/09/19 13:59:00 CDT, Stop date 09/09/19 13:59:00 CDT, Nurse collect  Urinalysis Complete a reflex to culture: Stat collect, Urine, 09/09/19 13:59:00 CDT, Stop date 09/09/19 13:59:00 CDT, Nurse collect, Voided if alert male; CCMS is alert female  Ordered (Exam Ordered)  CT Head W/O Contrast: Stat, 09/09/19 15:30:00 CDT, Altered level of Consciousness, None, Stretcher, Rad Type, Schedule this test, 09/09/19 15:30:00 CDT  Completed  Automated Diff: STAT collect, 09/09/19 14:09:00 CDT, Blood, Collected, Stop date 09/09/19 14:09:00 CDT, Lab Collect, Print Label By Order Location, 09/09/19 13:59:00 CDT  CBC w/ Auto Diff: STAT collect, 09/09/19 14:09:42 CDT, BLOOD, Collected, Stop date 09/09/19 14:09:00 CDT, Lab Collect  CMP: STAT collect, 09/09/19 14:09:42 CDT, BLOOD, Collected, Stop date 09/09/19 14:09:00 CDT, Lab Collect  Estimated Glomerular Filtration Rate: STAT collect, 09/09/19 14:09:00 CDT, Blood, Collected, Stop date 09/09/19 14:09:00 CDT, Lab Collect, Print Label By Order Location, 09/09/19 13:59:00 CDT  EtOH Level: STAT collect, 09/09/19 14:09:42 CDT, BLOOD, Collected, Stop date 09/09/19 14:09:00 CDT, Lab Collect  PT: STAT collect, 09/09/19 14:09:42 CDT, BLOOD, Collected, Stop date 09/09/19 14:09:00 CDT, Lab Collect  PTT: STAT collect, 09/09/19 14:09:42 CDT, BLOOD, Collected, Stop date 09/09/19 14:09:00 CDT, Lab  Collect  XR Chest 1 View: Stat, 09/09/19 13:59:00 CDT, Other (please specify), altered mental status, None, Stretcher, Rad Type, Not Scheduled, 09/09/19 13:59:00 CDT.   Electrocardiogram:  Time 9/9/2019 16:28:00, rate 112, T wave Inversion, V5, , V6, no STEMI.    Results review:  Lab results : Lab View   9/9/2019 16:35 CDT       U pH                      5.0                             U Spec Grav               1.020                             UA Appear                 CLOUDY                             UA Color                  DK YELLOW                             UA Spec Grav              1.020                             UA Bili                   Negative                             UA pH                     5.0                             UA Urobilinogen           0.2                             UA Blood                  Trace                             UA Glucose                Negative                             UA Ketones                Trace                             UA Protein                2+                             UA Nitrite                Negative                             UA Leuk Est               2+                             UA WBC                    1 /HPF                             UA RBC                    None Seen                             UA Bacteria               Moderate /HPF                             UA Squam Epithelial       None Seen                             U Amph Scr                Negative                             U Nikki Scr                Negative                             U Benzodia Scr            Negative                             U Cannab Scr              Negative                             U Cocaine Scr             Negative                             U Opiate Scr              Negative                             U Phencyclidine Scr       Negative    9/9/2019 14:09 CDT       Sodium Lvl                146 mmol/L  HI                             Potassium  Lvl             3.5 mmol/L                             Chloride                  105 mmol/L                             CO2                       30.0 mmol/L                             Calcium Lvl               10.3 mg/dL  HI                             Glucose Lvl               240 mg/dL  HI                             BUN                       33.0 mg/dL  HI                             Creatinine                0.82 mg/dL                             eGFR-AA                   >60 mL/min/1.73 m2  NA                             eGFR-ALLIE                  >60 mL/min/1.73 m2  NA                             Bili Total                0.5 mg/dL                             Bili Direct               0.20 mg/dL                             Bili Indirect             0.30 mg/dL                             AST                       34 unit/L                             ALT                       59 unit/L                             Alk Phos                  68 unit/L                             Total Protein             7.1 gm/dL                             Albumin Lvl               3.20 gm/dL  LOW                             Globulin                  3.90 gm/dL  HI                             A/G Ratio                 0.8  NA                             PT                        14.5 second(s)  HI                             INR                       1.1                             PTT                       25.8 second(s)                             WBC                       9.4 x10(3)/mcL                             RBC                       4.20 x10(6)/mcL                             Hgb                       13.4 gm/dL                             Hct                       42.3 %                             Platelet                  202 x10(3)/mcL                             MCV                       100.7 fL  HI                             MCH                       31.9 pg  HI                             MCHC                       31.7 gm/dL  LOW                             RDW                       13.9 %                             MPV                       11.6 fL                             Abs Neut                  6.94 x10(3)/mcL                             Neutro Auto               74 %  NA                             Lymph Auto                16 %  NA                             Mono Auto                 8 %  NA                             Eos Auto                  0 %  NA                             Abs Eos                   0.0 x10(3)/mcL                             Basophil Auto             0 %  NA                             Abs Neutro                6.94 x10(3)/mcL                             Abs Lymph                 1.5 x10(3)/mcL                             Abs Mono                  0.8 x10(3)/mcL                             Abs Baso                  0.0 x10(3)/mcL                             Ethanol Lvl               3.0 mg/dL  .   Radiology results:  Rad Results (ST)  < 12 hrs   Accession: [id number]  Order: CT Head W/O Contrast  Report Dt/Tm: 09/09/2019 16:21  Report:   Clinical History:  Altered level of consciousness     Technique:  Axial CT of the brain were obtained without contrast. There are  osseous reconstructed images available for review with coronal and  sagittal reconstructions.     Automatic exposure control was utilized to reduce the patient's  radiation dose.     Comparison:  September 5, 2019     Findings:  No acute intracranial hemorrhage.  Diffuse cerebral atrophy with concordant ventricular enlargement.   Scattered hypodensities throughout the deep periventricular white  matter.  Right parietal approach ventricular catheter is again noted.   Postsurgical changes of occipital craniotomy.   There is increasing hypodensity within the surgical bed which may be  related to edema and continued scoliosis or evolution. Infection is  not entirely excluded on the basis of CT as there is  limited  evaluation.  The mastoid air cells are clear.   The auditory canals are patent bilaterally.  The globes and orbital contents are normal bilaterally.  The visualized maxillary, ethmoid and sphenoid sinuses are clear.     Impression  No acute intracranial hemorrhage. Findings of chronic microvascular  ischemic disease.  Postsurgical changes of occipital craniotomy.   There is increasing hypodensity within the surgical bed which may be  related to edema and continued gliosis or evolution. Further  evaluation is limited on CT.      Accession: [id number]  Order: XR Chest 1 View  Report Dt/Tm: 09/09/2019 14:59  Report:      CLINICAL: Altered level of consciousness.     COMPARISON: August 1, 2019.                       FINDINGS: Cardiopericardial silhouette is within upper normal. Right  transjugular approach and right chest implanted tunneled portacatheter  terminate within the superior vena cava. Within the left lower lung  zone patchy opacities which may represent atelectasis without  exclusion of developing infiltrates. Lungs otherwise are clear. No  fluid within the pleural spaces. Right axillary metallic clips.      IMPRESSION:     Left lower lung zone patchy atelectasis without exclusion of  developing infiltrates. Follow-up exams are recommended.      .      Reexamination/ Reevaluation   Time: 9/9/2019 18:45:00 .   Course: unchanged.   Notes: Results discussed with patient and wife.  Given Rocephin for a UTI and will admit for further care   .      Impression and Plan   Diagnosis   Altered mental status (PNED [id number])   Status post craniotomy ([id number] Z98.890)   Acute UTI ([id number] N39.0)   Plan   Disposition: Admit time  9/9/2019 20:15:00, Admit to Inpatient Unit, Erasto Burdick MD    Counseled: Family, Regarding diagnosis, Regarding diagnostic results, Regarding treatment plan,  understood.    Notes: Yoan SCHAEFFER, acted solely as a scribe for and in the  presence of Dr. Garcia who performed the service. , I, Dr. Garcia, personally performed the services described in this documentation as scribed in my presence, and it is both accurate and complete..